# Patient Record
Sex: FEMALE | Race: WHITE | NOT HISPANIC OR LATINO | Employment: FULL TIME | URBAN - METROPOLITAN AREA
[De-identification: names, ages, dates, MRNs, and addresses within clinical notes are randomized per-mention and may not be internally consistent; named-entity substitution may affect disease eponyms.]

---

## 2019-06-27 ENCOUNTER — OFFICE VISIT (OUTPATIENT)
Dept: FAMILY MEDICINE CLINIC | Facility: CLINIC | Age: 24
End: 2019-06-27

## 2019-06-27 VITALS
DIASTOLIC BLOOD PRESSURE: 74 MMHG | WEIGHT: 150 LBS | HEART RATE: 60 BPM | HEIGHT: 70 IN | TEMPERATURE: 98.2 F | SYSTOLIC BLOOD PRESSURE: 120 MMHG | BODY MASS INDEX: 21.47 KG/M2

## 2019-06-27 DIAGNOSIS — J45.909 ASTHMA, UNSPECIFIED ASTHMA SEVERITY, UNSPECIFIED WHETHER COMPLICATED, UNSPECIFIED WHETHER PERSISTENT: Primary | ICD-10-CM

## 2019-06-27 DIAGNOSIS — Z13.0 SCREENING FOR DEFICIENCY ANEMIA: ICD-10-CM

## 2019-06-27 DIAGNOSIS — Z00.00 ANNUAL PHYSICAL EXAM: ICD-10-CM

## 2019-06-27 DIAGNOSIS — Z76.89 REFERRAL OF PATIENT: ICD-10-CM

## 2019-06-27 DIAGNOSIS — Z13.1 SCREENING FOR DIABETES MELLITUS: ICD-10-CM

## 2019-06-27 DIAGNOSIS — Z13.220 SCREENING FOR LIPID DISORDERS: ICD-10-CM

## 2019-06-27 DIAGNOSIS — Z13.29 SCREENING FOR THYROID DISORDER: ICD-10-CM

## 2019-06-27 PROCEDURE — 99385 PREV VISIT NEW AGE 18-39: CPT | Performed by: NURSE PRACTITIONER

## 2019-06-27 RX ORDER — BUDESONIDE AND FORMOTEROL FUMARATE DIHYDRATE 160; 4.5 UG/1; UG/1
AEROSOL RESPIRATORY (INHALATION) EVERY 12 HOURS SCHEDULED
COMMUNITY
End: 2019-06-27 | Stop reason: SDUPTHER

## 2019-06-27 RX ORDER — LEVOCETIRIZINE DIHYDROCHLORIDE 5 MG/1
5 TABLET, FILM COATED ORAL EVERY EVENING
COMMUNITY
End: 2019-06-27

## 2019-06-27 RX ORDER — ALBUTEROL SULFATE 90 UG/1
2 AEROSOL, METERED RESPIRATORY (INHALATION) EVERY 4 HOURS PRN
COMMUNITY
End: 2019-06-27 | Stop reason: SDUPTHER

## 2019-06-27 RX ORDER — LEVOCETIRIZINE DIHYDROCHLORIDE 5 MG/1
TABLET, FILM COATED ORAL
COMMUNITY
End: 2022-12-09 | Stop reason: SDUPTHER

## 2019-06-27 RX ORDER — AZELASTINE 1 MG/ML
2 SPRAY, METERED NASAL 2 TIMES DAILY
COMMUNITY
End: 2019-06-27

## 2019-06-27 RX ORDER — IPRATROPIUM BROMIDE AND ALBUTEROL SULFATE 2.5; .5 MG/3ML; MG/3ML
3 SOLUTION RESPIRATORY (INHALATION) EVERY 4 HOURS PRN
COMMUNITY
End: 2019-07-02 | Stop reason: SDUPTHER

## 2019-06-27 RX ORDER — AZELASTINE 1 MG/ML
SPRAY, METERED NASAL EVERY 12 HOURS SCHEDULED
COMMUNITY
End: 2022-12-09 | Stop reason: SDUPTHER

## 2019-06-27 RX ORDER — ALBUTEROL SULFATE 90 UG/1
2 AEROSOL, METERED RESPIRATORY (INHALATION) EVERY 4 HOURS PRN
Qty: 18 G | Refills: 11 | Status: SHIPPED | OUTPATIENT
Start: 2019-06-27

## 2019-06-27 RX ORDER — BUDESONIDE AND FORMOTEROL FUMARATE DIHYDRATE 160; 4.5 UG/1; UG/1
2 AEROSOL RESPIRATORY (INHALATION) 2 TIMES DAILY PRN
Qty: 10.2 G | Refills: 11 | Status: SHIPPED | OUTPATIENT
Start: 2019-06-27 | End: 2020-04-07 | Stop reason: SDUPTHER

## 2019-06-27 RX ORDER — BUDESONIDE AND FORMOTEROL FUMARATE DIHYDRATE 160; 4.5 UG/1; UG/1
2 AEROSOL RESPIRATORY (INHALATION)
COMMUNITY
End: 2019-06-27

## 2019-06-27 NOTE — PROGRESS NOTES
Marilu Salvador presents today to establish care.    Chief Complaint   Patient presents with   • Establish Care     new patient she comes in today to be seen for asthma and medication refill.   • Med Refill   • Annual Exam        HPI   Marilu presents today to establish care.  Has no concerns today and just needs medication refills.  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  Previously worked at .    Asthma  Chronic.  Stable.  History of asthma since childhood.  Needing refill on Symbicort.  She can tell when she does not use her Symbicort daily.  Has a rescue albuterol inhaler.  Also has DuoNeb at home as needed.  Has not had to use this in a while.  Does not have a specialist such as pulmonologist or allergy specialist.    Digestion issues  New.  Improving.  States that she just recently went to gluten-free and feels that it is helping her digestion issues.  Before, she had irritable bowel issues.  She is requesting some information on gluten-free foods.  Declines food allergy testing at this time.    Health Maintenance:  -Sees dentist and opthalmology regularly.  -Sexually active  -Birth control: Mirena  -Use of seatbelt 100% of the time  -Smoke/CO detector working in home  -Nonsmoker  -Vaccines UTD  -Other providers: Women's health at .  UTD on pap.  Requesting [facility] gynecology provider.  I will place referral.    PHQ-2/PHQ-9 Depression Screening 6/27/2019   Little interest or pleasure in doing things 0   Feeling down, depressed, or hopeless 0   Total Score 0       Review of Systems   Constitutional: Negative for activity change, fatigue, unexpected weight gain and unexpected weight loss.   HENT: Negative for congestion.    Eyes: Negative for blurred vision and visual disturbance.   Respiratory: Negative for chest tightness, shortness of breath and wheezing.    Cardiovascular: Negative for chest pain, palpitations and leg swelling.   Gastrointestinal: Negative for constipation,  diarrhea, nausea, vomiting and GERD.   Musculoskeletal: Negative for arthralgias.   Neurological: Negative for dizziness, light-headedness, headache and confusion.   Psychiatric/Behavioral: Negative for depressed mood and stress. The patient is not nervous/anxious.         Past Medical History:   Diagnosis Date   • Asthma    • GERD (gastroesophageal reflux disease)    • Kidney stones         Past Surgical History:   Procedure Laterality Date   • TONSILLECTOMY     • UMBILICAL HERNIA REPAIR     • WISDOM TOOTH EXTRACTION          Family History   Problem Relation Age of Onset   • Arthritis Mother    • Thyroid disease Mother    • Hypertension Father         Social History     Socioeconomic History   • Marital status: Single     Spouse name: Not on file   • Number of children: Not on file   • Years of education: Not on file   • Highest education level: Not on file   Tobacco Use   • Smoking status: Never Smoker   • Smokeless tobacco: Never Used   Substance and Sexual Activity   • Alcohol use: Yes     Comment: 2-3 per week   • Drug use: No   • Sexual activity: Defer        Current Outpatient Medications on File Prior to Visit   Medication Sig Dispense Refill   • ipratropium-albuterol (DUO-NEB) 0.5-2.5 mg/3 ml nebulizer Take 3 mL by nebulization Every 4 (Four) Hours As Needed.     • levonorgestrel (MIRENA, 52 MG,) 20 MCG/24HR IUD 1 each by Intrauterine route 1 (One) Time.     • [DISCONTINUED] albuterol sulfate  (90 Base) MCG/ACT inhaler Inhale 2 puffs Every 4 (Four) Hours As Needed for Wheezing.     • [DISCONTINUED] azelastine (ASTELIN) 0.1 % nasal spray 2 sprays into the nostril(s) as directed by provider 2 (Two) Times a Day. Use in each nostril as directed     • [DISCONTINUED] budesonide-formoterol (SYMBICORT) 160-4.5 MCG/ACT inhaler Inhale 2 puffs 2 (Two) Times a Day.     • [DISCONTINUED] levocetirizine (XYZAL) 5 MG tablet Take 5 mg by mouth Every Evening.     • azelastine (ASTELIN) 0.1 % nasal spray Every 12  "(Twelve) Hours.     • levocetirizine (XYZAL) 5 MG tablet Xyzal 5 mg tablet   Take 1 tablet every day by oral route for 30 days.     • [DISCONTINUED] budesonide-formoterol (SYMBICORT) 160-4.5 MCG/ACT inhaler Every 12 (Twelve) Hours.       No current facility-administered medications on file prior to visit.        No Known Allergies     Visit Vitals  /74   Pulse 60   Temp 98.2 °F (36.8 °C)   Ht 177.8 cm (70\")   Wt 68 kg (150 lb)   BMI 21.52 kg/m²        Physical Exam   Constitutional: She is oriented to person, place, and time. Vital signs are normal. She appears well-developed and well-nourished. No distress.   HENT:   Head: Normocephalic.   Right Ear: Hearing, tympanic membrane, external ear and ear canal normal.   Left Ear: Hearing, tympanic membrane, external ear and ear canal normal.   Nose: Nose normal.   Mouth/Throat: Uvula is midline and oropharynx is clear and moist.   Eyes: Conjunctivae and lids are normal. Pupils are equal, round, and reactive to light. Right eye exhibits no discharge. Left eye exhibits no discharge. No scleral icterus.   Neck: Normal range of motion. No JVD present. Carotid bruit is not present. No thyromegaly present.   Cardiovascular: Normal rate, regular rhythm, normal heart sounds and intact distal pulses. Exam reveals no gallop and no friction rub.   No murmur heard.  Pulmonary/Chest: Effort normal and breath sounds normal. No respiratory distress.   Abdominal: Soft. Normal appearance and bowel sounds are normal. She exhibits no distension and no mass. There is no hepatosplenomegaly. There is no tenderness. There is no rebound, no guarding, no CVA tenderness, no tenderness at McBurney's point and negative Warner's sign. No hernia.   Musculoskeletal: Normal range of motion. She exhibits no edema.   Lymphadenopathy:        Head (right side): No submental, no submandibular, no tonsillar, no preauricular, no posterior auricular and no occipital adenopathy present.        Head (left " side): No submental, no submandibular, no tonsillar, no preauricular, no posterior auricular and no occipital adenopathy present.     She has no cervical adenopathy.   Neurological: She is alert and oriented to person, place, and time. She has normal strength. She displays normal reflexes. No sensory deficit. Coordination normal. GCS eye subscore is 4. GCS verbal subscore is 5. GCS motor subscore is 6.   Skin: Skin is warm, dry and intact. Capillary refill takes less than 2 seconds. No rash noted. No erythema.   Psychiatric: She has a normal mood and affect. Her speech is normal and behavior is normal. Judgment and thought content normal.   Nursing note and vitals reviewed.       No results found for this or any previous visit.     Assessment/Plan  Marilu was seen today for establish care, med refill and annual exam.    Diagnoses and all orders for this visit:    Asthma, unspecified asthma severity, unspecified whether complicated, unspecified whether persistent  -     albuterol sulfate  (90 Base) MCG/ACT inhaler; Inhale 2 puffs Every 4 (Four) Hours As Needed for Wheezing.  -     budesonide-formoterol (SYMBICORT) 160-4.5 MCG/ACT inhaler; Inhale 2 puffs 2 (Two) Times a Day As Needed (shortness of breath).    Screening for deficiency anemia  -     CBC & Differential; Future  Screening for diabetes mellitus  -     Comprehensive Metabolic Panel; Future  Screening for lipid disorders  -     Lipid Panel; Future  Screening for thyroid disorder  -     TSH Rfx On Abnormal To Free T4; Future  Referral of patient  -     Ambulatory Referral to Gynecology    Annual physical exam      Assessment/Plan   1. Healthy female exam.    2. Patient Counseling: Including but not Limited to the following, when appropriate:  --Nutrition: Stressed importance of moderation in sodium/caffeine intake, saturated fat and cholesterol, caloric balance, sufficient intake of fresh fruits, vegetables, fiber, calcium, iron, and 1 mg of folate  "supplement per day (for females capable of pregnancy).  --Discussed the issue of estrogen replacement, calcium supplement, and the daily use of baby aspirin.  --Exercise: Stressed the importance of regular exercise.   --Substance Abuse: Discussed cessation/primary prevention of tobacco, alcohol, or other drug use; driving or other dangerous activities under the influence; availability of treatment for abuse, as indicated based on social history.    --Sexuality: Discussed sexually transmitted diseases, partner selection, use of condoms, avoidance of unintended pregnancy  and contraceptive alternatives.   --Injury prevention: Discussed safety belts, safety helmets, smoke detector, smoking near bedding or upholstery.   --Dental health: Discussed importance of regular tooth brushing, flossing, and dental visits.  --Immunizations reviewed.  --Discussed benefits of colon cancer screening.      3. Discussed the patient's BMI with her.  The BMI is in the acceptable range  4. Return in about 1 year (around 6/27/2020) for Annual.  5. Age-appropriate Screening Scheduled  6.   Patient Instructions   Low-Gluten Eating Plan  Gluten is a protein that is found in wheat, barley, rye, and some other grains. Some people have a condition that makes them unable to digest gluten. For those people, eating just a small amount of gluten can damage their intestines.  This is not a gluten-free eating plan. This low-gluten eating plan is for people who feel better when they eat less gluten.  What do I need to know about this eating plan?  · You can eat anything that does not contain wheat or other grains that have gluten.  · You can eat anything that is labeled \"gluten-free.\"  · Make sure to read food labels.  · Eat a variety of foods so you get all of the nutrients that you need.  · Avoid processed foods and sauces because many of them contain wheat. To have more control over the ingredients in your meals, consider making food yourself " "instead of buying prepared foods.  What foods can I eat?  Grains  Rice. Bulgur. Quinoa. Corn. Buckwheat. Amaranth. Corn tortillas or taco shells. Oatmeal that is labeled as “gluten free” or “uncontaminated.\"  Vegetables  Lettuce. Spinach. Peas. Beets. Cauliflower. Cabbage. Broccoli. Carrots. Tomatoes. Squash. Eggplant. Herbs. Peppers. Onions. Cucumbers. [place] sprouts. Yams and sweet potatoes. Beans. Lentils.  Fruits  Bananas. Apples. Oranges. Grapes. Papaya. Dominick. Pomegranate. Kiwi. Grapefruit. Cherries.  Meats and Other Protein Sources  Beef. Pork. Chicken. Turkey. Fish. Eggs. Tofu. Beans. Nuts. Lentils.  Dairy  Milk. Ice cream. Yogurt. Cheese. Cottage cheese.  Beverages  Water. Coffee. Tea. Juice. Soda. [place] water.  Condiments  Mustard. Relish. Low-fat, low-sugar ketchup. Low-fat, low-sugar barbecue sauce. Vinegar. Low-fat or fat-free mayonnaise.  Sweets and Desserts  Honey. Sugar. Maple syrup.  Fats and Oils  Butter. Vegetable oil. Olive oil. Canola oil. [place] oil.  Other  Arrowroot or cornstarch. Potato flour.  The items listed above may not be a complete list of recommended foods or beverages. Contact your dietitian for more options.  What foods are not recommended?  Grains  Wheat. Barley. Rye. Oatmeal.  Meat and Other Protein Sources  Seitan. Cold cuts. Hotdogs. Salami. Sausages.  Beverages  Beer.  Condiments  Malt vinegar. Salad dressing. Soy sauce.  Sweets and Desserts  Licorice. Brown rice syrup. Pre-made pudding or pudding mixes.  Other  Bouillon cubes. Canned or boxed pre-made soups or soup packets. Bagged chips, such as potato chips and tortilla chips. Seasoning packets.  The items listed above may not be a complete list of foods and beverages to avoid. Contact your dietitian for more information.  This information is not intended to replace advice given to you by your health care provider. Make sure you discuss any questions you have with your health care provider.  Document Released: 05/03/2016 " "Document Revised: 05/25/2017 Document Reviewed: 01/13/2016  [employer] Interactive Patient Education © 2019 [employer]  Gluten-Free Diet for Celiac Disease, Adult  The gluten-free diet includes all foods that do not contain gluten. Gluten is a protein that is found in wheat, rye, barley, and some other grains. Following the gluten-free diet is the only treatment for people with celiac disease. It helps to prevent damage to the intestines and improves or eliminates the symptoms of celiac disease.  Following the gluten-free diet requires some planning. It can be challenging at first, but it gets easier with time and practice. There are more gluten-free options available today than ever before. If you need help finding gluten-free foods or if you have questions, talk with your diet and nutrition specialist (registered dietitian) or your health care provider.  What do I need to know about a gluten-free diet?  · All fruits, vegetables, and meats are safe to eat and do not contain gluten.  · When grocery shopping, start by shopping in the produce, meat, and dairy sections. These sections are more likely to contain gluten-free foods. Then move to the aisles that contain packaged foods if you need to.  · Read all food labels. Gluten is often added to foods. Always check the ingredient list and look for warnings, such as “may contain gluten.\"  · Talk with your dietitian or health care provider before taking a gluten-free multivitamin or mineral supplement.  · Be aware of gluten-free foods having contact with foods that contain gluten (cross-contamination). This can happen at home and with any processed foods.  ? Talk with your health care provider or dietitian about how to reduce the risk of cross-contamination in your home.  ? If you have questions about how a food is processed, ask the .  What key words help to identify gluten?  Foods that list any of these key words on the label usually contain " gluten:  · Wheat, flour, enriched flour, bromated flour, white flour, durum flour, jenifer flour, phosphated flour, self-rising flour, semolina, farina, barley (malt), rye, and oats.  · Starch, dextrin, modified food starch, or cereal.  · Thickening, fillers, or emulsifiers.  · Malt flavoring, malt extract, or malt syrup.  · Hydrolyzed vegetable protein.    In the [place] packaged foods that are gluten-free are required to be labeled “GF.” These foods should be easy to identify and are safe to eat. In the [place] food companies are also required to list common food allergens, including wheat, on their labels.  Recommended foods  Grains  · Amaranth, bean flours, 100% buckwheat flour, corn, millet, nut flours or nut meals, GF oats, quinoa, rice, sorghum, teff, rice wafers, pure cornmeal tortillas, popcorn, and hot cereals made from cornmeal. [place], rice, wild rice. Some Asian rice noodles or bean noodles. Arrowroot starch, corn bran, corn flour, corn germ, cornmeal, corn starch, potato flour, potato starch flour, and rice bran. Plain, brown, and sweet rice flours. Rice polish, soy flour, and tapioca starch.  Vegetables  · All plain fresh, frozen, and canned vegetables.  Fruits  · All plain fresh, frozen, canned, and dried fruits, and 100% fruit juices.  Meats and other protein foods  · All fresh beef, pork, poultry, fish, seafood, and eggs. Fish canned in water, oil, brine, or vegetable broth. Plain nuts and seeds, peanut butter. Some lunch meat and some frankfurters. Dried beans, dried peas, and lentils.  Dairy  · Fresh plain, dry, evaporated, or condensed milk. Cream, butter, sour cream, whipping cream, and most yogurts. Unprocessed cheese, most processed cheeses, some cottage cheese, some cream cheeses.  Beverages  · Coffee, tea, most herbal teas. Carbonated beverages and some root beers. Wine, sake, and distilled spirits, such as gin, vodka, and whiskey. Most hard ciders.  Fats and oils  · Butter, margarine, vegetable  oil, hydrogenated butter, olive oil, shortening, lard, cream, and some mayonnaise. Some commercial salad dressings. Olives.  Sweets and desserts  · Sugar, honey, some syrups, molasses, jelly, and jam. Plain hard candy, marshmallows, and gumdrops. Pure cocoa powder. Plain chocolate. Custard and some pudding mixes. Gelatin desserts, sorbets, frozen ice pops, and sherbet. Cake, cookies, and other desserts prepared with allowed flours. Some commercial ice creams. Cornstarch, tapioca, and rice puddings.  Seasoning and other foods  · Some canned or frozen soups. Monosodium glutamate (MSG). Cider, rice, and wine vinegar. Baking soda and baking powder. Cream of tartar. Baking and nutritional yeast. Certain soy sauces made without wheat (ask your dietitian about specific brands that are allowed). Nuts, coconut, and chocolate. Salt, pepper, herbs, spices, flavoring extracts, imitation or artificial flavorings, natural flavorings, and food colorings. Some medicines and supplements. Some lip glosses and other cosmetics. Rice syrups.  The items listed may not be a complete list. Talk with your dietitian about what dietary choices are best for you.  Foods to avoid  Grains  · Barley, bran, bulgur, couscous, cracked wheat, [place], farro, jenifer, malt, matzo, semolina, wheat germ, and all wheat and rye cereals including spelt and kamut. Cereals containing malt as a flavoring, such as rice cereal. Noodles, spaghetti, macaroni, most packaged rice mixes, and all mixes containing wheat, rye, barley, or triticale.  Vegetables  · Most creamed vegetables and most vegetables canned in sauces. Some commercially prepared vegetables and salads.  Fruits  · Thickened or prepared fruits and some pie fillings. Some fruit snacks and fruit roll-ups.  Meats and other protein foods  · Any meat or meat alternative containing wheat, rye, barley, or gluten stabilizers. These are often marinated or packaged meats and lunch meats. Bread-containing  products, such as Swiss steak, croquettes, meatballs, and meatloaf. Most tuna canned in vegetable broth and turkey with hydrolyzed vegetable protein (HVP) injected as part of the basting. Seitan. Imitation fish. Eggs in sauces made from ingredients to avoid.  Dairy  · Commercial chocolate milk drinks and malted milk. Some non-dairy creamers. Any cheese product containing ingredients to avoid.  Beverages  · Certain cereal beverages. Beer, araceli, malted milk, and some root beers. Some hard ciders. Some instant flavored coffees. Some herbal teas made with barley or with barley malt added.  Fats and oils  · Some commercial salad dressings. Sour cream containing modified food starch.  Sweets and desserts  · Some toffees. Chocolate-coated nuts (may be rolled in wheat flour) and some commercial candies and candy bars. Most cakes, cookies, donuts, pastries, and other baked goods. Some commercial ice cream. Ice cream cones. Commercially prepared mixes for cakes, cookies, and other desserts. Bread pudding and other puddings thickened with flour. Products containing brown rice syrup made with barley malt enzyme. Desserts and sweets made with malt flavoring.  Seasoning and other foods  · Some peralta powders, some dry seasoning mixes, some gravy extracts, some meat sauces, some ketchups, some prepared mustards, and horseradish. Certain soy sauces. Malt vinegar. Bouillon and bouillon cubes that contain HVP. Some chip dips, and some chewing gum. Yeast extract. Stewart’s yeast. Caramel color. Some medicines and supplements. Some lip glosses and other cosmetics.  The items listed may not be a complete list. Talk with your dietitian about what dietary choices are best for you.  Summary  · Gluten is a protein that is found in wheat, rye, barley, and some other grains. The gluten-free diet includes all foods that do not contain gluten.  · If you need help finding gluten-free foods or if you have questions, talk with your diet and nutrition  "specialist (registered dietitian) or your health care provider.  · Read all food labels. Gluten is often added to foods. Always check the ingredient list and look for warnings, such as “may contain gluten.\"  This information is not intended to replace advice given to you by your health care provider. Make sure you discuss any questions you have with your health care provider.  Document Released: 12/18/2006 Document Revised: 10/02/2017 Document Reviewed: 10/02/2017  [employer] Interactive Patient Education © 2019 [employer]           I will notify lab results.  Return in about 1 year (around 6/27/2020) for Annual.  "

## 2019-06-27 NOTE — PATIENT INSTRUCTIONS
"Low-Gluten Eating Plan  Gluten is a protein that is found in wheat, barley, rye, and some other grains. Some people have a condition that makes them unable to digest gluten. For those people, eating just a small amount of gluten can damage their intestines.  This is not a gluten-free eating plan. This low-gluten eating plan is for people who feel better when they eat less gluten.  What do I need to know about this eating plan?  · You can eat anything that does not contain wheat or other grains that have gluten.  · You can eat anything that is labeled \"gluten-free.\"  · Make sure to read food labels.  · Eat a variety of foods so you get all of the nutrients that you need.  · Avoid processed foods and sauces because many of them contain wheat. To have more control over the ingredients in your meals, consider making food yourself instead of buying prepared foods.  What foods can I eat?  Grains  Rice. Bulgur. Quinoa. Corn. Buckwheat. Amaranth. Corn tortillas or taco shells. Oatmeal that is labeled as “gluten free” or “uncontaminated.\"  Vegetables  Lettuce. Spinach. Peas. Beets. Cauliflower. Cabbage. Broccoli. Carrots. Tomatoes. Squash. Eggplant. Herbs. Peppers. Onions. Cucumbers. [place] sprouts. Yams and sweet potatoes. Beans. Lentils.  Fruits  Bananas. Apples. Oranges. Grapes. Papaya. Dominick. Pomegranate. Kiwi. Grapefruit. Cherries.  Meats and Other Protein Sources  Beef. Pork. Chicken. Turkey. Fish. Eggs. Tofu. Beans. Nuts. Lentils.  Dairy  Milk. Ice cream. Yogurt. Cheese. Cottage cheese.  Beverages  Water. Coffee. Tea. Juice. Soda. [place] water.  Condiments  Mustard. Relish. Low-fat, low-sugar ketchup. Low-fat, low-sugar barbecue sauce. Vinegar. Low-fat or fat-free mayonnaise.  Sweets and Desserts  Honey. Sugar. Maple syrup.  Fats and Oils  Butter. Vegetable oil. Olive oil. Canola oil. [place] oil.  Other  Arrowroot or cornstarch. Potato flour.  The items listed above may not be a complete list of recommended foods " or beverages. Contact your dietitian for more options.  What foods are not recommended?  Grains  Wheat. Barley. Rye. Oatmeal.  Meat and Other Protein Sources  Seitan. Cold cuts. Hotdogs. Salami. Sausages.  Beverages  Beer.  Condiments  Malt vinegar. Salad dressing. Soy sauce.  Sweets and Desserts  Licorice. Brown rice syrup. Pre-made pudding or pudding mixes.  Other  Bouillon cubes. Canned or boxed pre-made soups or soup packets. Bagged chips, such as potato chips and tortilla chips. Seasoning packets.  The items listed above may not be a complete list of foods and beverages to avoid. Contact your dietitian for more information.  This information is not intended to replace advice given to you by your health care provider. Make sure you discuss any questions you have with your health care provider.  Document Released: 05/03/2016 Document Revised: 05/25/2017 Document Reviewed: 01/13/2016  [employer] Interactive Patient Education © 2019 [employer]  Gluten-Free Diet for Celiac Disease, Adult  The gluten-free diet includes all foods that do not contain gluten. Gluten is a protein that is found in wheat, rye, barley, and some other grains. Following the gluten-free diet is the only treatment for people with celiac disease. It helps to prevent damage to the intestines and improves or eliminates the symptoms of celiac disease.  Following the gluten-free diet requires some planning. It can be challenging at first, but it gets easier with time and practice. There are more gluten-free options available today than ever before. If you need help finding gluten-free foods or if you have questions, talk with your diet and nutrition specialist (registered dietitian) or your health care provider.  What do I need to know about a gluten-free diet?  · All fruits, vegetables, and meats are safe to eat and do not contain gluten.  · When grocery shopping, start by shopping in the produce, meat, and dairy sections. These sections are more  "likely to contain gluten-free foods. Then move to the aisles that contain packaged foods if you need to.  · Read all food labels. Gluten is often added to foods. Always check the ingredient list and look for warnings, such as “may contain gluten.\"  · Talk with your dietitian or health care provider before taking a gluten-free multivitamin or mineral supplement.  · Be aware of gluten-free foods having contact with foods that contain gluten (cross-contamination). This can happen at home and with any processed foods.  ? Talk with your health care provider or dietitian about how to reduce the risk of cross-contamination in your home.  ? If you have questions about how a food is processed, ask the .  What key words help to identify gluten?  Foods that list any of these key words on the label usually contain gluten:  · Wheat, flour, enriched flour, bromated flour, white flour, durum flour, jenifer flour, phosphated flour, self-rising flour, semolina, farina, barley (malt), rye, and oats.  · Starch, dextrin, modified food starch, or cereal.  · Thickening, fillers, or emulsifiers.  · Malt flavoring, malt extract, or malt syrup.  · Hydrolyzed vegetable protein.    In the [place] packaged foods that are gluten-free are required to be labeled “GF.” These foods should be easy to identify and are safe to eat. In the [place] food companies are also required to list common food allergens, including wheat, on their labels.  Recommended foods  Grains  · Amaranth, bean flours, 100% buckwheat flour, corn, millet, nut flours or nut meals, GF oats, quinoa, rice, sorghum, teff, rice wafers, pure cornmeal tortillas, popcorn, and hot cereals made from cornmeal. [place], rice, wild rice. Some Asian rice noodles or bean noodles. Arrowroot starch, corn bran, corn flour, corn germ, cornmeal, corn starch, potato flour, potato starch flour, and rice bran. Plain, brown, and sweet rice flours. Rice polish, soy flour, and tapioca " starch.  Vegetables  · All plain fresh, frozen, and canned vegetables.  Fruits  · All plain fresh, frozen, canned, and dried fruits, and 100% fruit juices.  Meats and other protein foods  · All fresh beef, pork, poultry, fish, seafood, and eggs. Fish canned in water, oil, brine, or vegetable broth. Plain nuts and seeds, peanut butter. Some lunch meat and some frankfurters. Dried beans, dried peas, and lentils.  Dairy  · Fresh plain, dry, evaporated, or condensed milk. Cream, butter, sour cream, whipping cream, and most yogurts. Unprocessed cheese, most processed cheeses, some cottage cheese, some cream cheeses.  Beverages  · Coffee, tea, most herbal teas. Carbonated beverages and some root beers. Wine, sake, and distilled spirits, such as gin, vodka, and whiskey. Most hard ciders.  Fats and oils  · Butter, margarine, vegetable oil, hydrogenated butter, olive oil, shortening, lard, cream, and some mayonnaise. Some commercial salad dressings. Olives.  Sweets and desserts  · Sugar, honey, some syrups, molasses, jelly, and jam. Plain hard candy, marshmallows, and gumdrops. Pure cocoa powder. Plain chocolate. Custard and some pudding mixes. Gelatin desserts, sorbets, frozen ice pops, and sherbet. Cake, cookies, and other desserts prepared with allowed flours. Some commercial ice creams. Cornstarch, tapioca, and rice puddings.  Seasoning and other foods  · Some canned or frozen soups. Monosodium glutamate (MSG). Cider, rice, and wine vinegar. Baking soda and baking powder. Cream of tartar. Baking and nutritional yeast. Certain soy sauces made without wheat (ask your dietitian about specific brands that are allowed). Nuts, coconut, and chocolate. Salt, pepper, herbs, spices, flavoring extracts, imitation or artificial flavorings, natural flavorings, and food colorings. Some medicines and supplements. Some lip glosses and other cosmetics. Rice syrups.  The items listed may not be a complete list. Talk with your dietitian  about what dietary choices are best for you.  Foods to avoid  Grains  · Barley, bran, bulgur, couscous, cracked wheat, [place], farro, jenifer, malt, matzo, semolina, wheat germ, and all wheat and rye cereals including spelt and kamut. Cereals containing malt as a flavoring, such as rice cereal. Noodles, spaghetti, macaroni, most packaged rice mixes, and all mixes containing wheat, rye, barley, or triticale.  Vegetables  · Most creamed vegetables and most vegetables canned in sauces. Some commercially prepared vegetables and salads.  Fruits  · Thickened or prepared fruits and some pie fillings. Some fruit snacks and fruit roll-ups.  Meats and other protein foods  · Any meat or meat alternative containing wheat, rye, barley, or gluten stabilizers. These are often marinated or packaged meats and lunch meats. Bread-containing products, such as Swiss steak, croquettes, meatballs, and meatloaf. Most tuna canned in vegetable broth and turkey with hydrolyzed vegetable protein (HVP) injected as part of the basting. Seitan. Imitation fish. Eggs in sauces made from ingredients to avoid.  Dairy  · Commercial chocolate milk drinks and malted milk. Some non-dairy creamers. Any cheese product containing ingredients to avoid.  Beverages  · Certain cereal beverages. Beer, araceli, malted milk, and some root beers. Some hard ciders. Some instant flavored coffees. Some herbal teas made with barley or with barley malt added.  Fats and oils  · Some commercial salad dressings. Sour cream containing modified food starch.  Sweets and desserts  · Some toffees. Chocolate-coated nuts (may be rolled in wheat flour) and some commercial candies and candy bars. Most cakes, cookies, donuts, pastries, and other baked goods. Some commercial ice cream. Ice cream cones. Commercially prepared mixes for cakes, cookies, and other desserts. Bread pudding and other puddings thickened with flour. Products containing brown rice syrup made with barley malt enzyme.  "Desserts and sweets made with malt flavoring.  Seasoning and other foods  · Some peralta powders, some dry seasoning mixes, some gravy extracts, some meat sauces, some ketchups, some prepared mustards, and horseradish. Certain soy sauces. Malt vinegar. Bouillon and bouillon cubes that contain HVP. Some chip dips, and some chewing gum. Yeast extract. Stewart’s yeast. Caramel color. Some medicines and supplements. Some lip glosses and other cosmetics.  The items listed may not be a complete list. Talk with your dietitian about what dietary choices are best for you.  Summary  · Gluten is a protein that is found in wheat, rye, barley, and some other grains. The gluten-free diet includes all foods that do not contain gluten.  · If you need help finding gluten-free foods or if you have questions, talk with your diet and nutrition specialist (registered dietitian) or your health care provider.  · Read all food labels. Gluten is often added to foods. Always check the ingredient list and look for warnings, such as “may contain gluten.\"  This information is not intended to replace advice given to you by your health care provider. Make sure you discuss any questions you have with your health care provider.  Document Released: 12/18/2006 Document Revised: 10/02/2017 Document Reviewed: 10/02/2017  [employer] Interactive Patient Education © 2019 [employer]    "

## 2019-06-28 ENCOUNTER — TELEPHONE (OUTPATIENT)
Dept: FAMILY MEDICINE CLINIC | Facility: CLINIC | Age: 24
End: 2019-06-28

## 2019-06-28 NOTE — TELEPHONE ENCOUNTER
Patient called wanting to switch her Proair inhaler and symbicort over to the [facility] retail pharmacy. She called Kim to switch them as instructed and they sent her back to us. Her call back number is [phone]

## 2019-06-28 NOTE — TELEPHONE ENCOUNTER
I spoke to [facility] pharmacy they instructed the patient to call [facility] pharmacy to have the medications transferred. I called [facility] pharmacy and told them that she would like to have the rx's sent to [facility] transferred to [facility]. The person that I spoke to could see all of the prescriptions that were sent to [facility] yesterday they will dispense them to the patient after they get them transferred.

## 2019-07-02 RX ORDER — IPRATROPIUM BROMIDE AND ALBUTEROL SULFATE 2.5; .5 MG/3ML; MG/3ML
3 SOLUTION RESPIRATORY (INHALATION) EVERY 4 HOURS PRN
Qty: 360 ML | Refills: 0 | Status: SHIPPED | OUTPATIENT
Start: 2019-07-02 | End: 2022-06-04 | Stop reason: SDUPTHER

## 2019-09-23 ENCOUNTER — OFFICE VISIT (OUTPATIENT)
Dept: FAMILY MEDICINE CLINIC | Facility: CLINIC | Age: 24
End: 2019-09-23

## 2019-09-23 VITALS
DIASTOLIC BLOOD PRESSURE: 88 MMHG | TEMPERATURE: 98.3 F | OXYGEN SATURATION: 98 % | HEART RATE: 84 BPM | BODY MASS INDEX: 22.33 KG/M2 | SYSTOLIC BLOOD PRESSURE: 120 MMHG | WEIGHT: 156 LBS | HEIGHT: 70 IN

## 2019-09-23 DIAGNOSIS — R10.84 GENERALIZED ABDOMINAL PAIN: ICD-10-CM

## 2019-09-23 DIAGNOSIS — Z13.0 SCREENING FOR DEFICIENCY ANEMIA: ICD-10-CM

## 2019-09-23 DIAGNOSIS — Z13.1 SCREENING FOR DIABETES MELLITUS: ICD-10-CM

## 2019-09-23 DIAGNOSIS — Z13.29 SCREENING FOR THYROID DISORDER: ICD-10-CM

## 2019-09-23 DIAGNOSIS — R06.02 SHORTNESS OF BREATH: Primary | ICD-10-CM

## 2019-09-23 DIAGNOSIS — R14.0 BLOATING: ICD-10-CM

## 2019-09-23 DIAGNOSIS — R07.9 CHEST PAIN, UNSPECIFIED TYPE: ICD-10-CM

## 2019-09-23 DIAGNOSIS — R80.9 PROTEINURIA, UNSPECIFIED TYPE: ICD-10-CM

## 2019-09-23 PROBLEM — J45.20 MILD INTERMITTENT ASTHMA: Status: ACTIVE | Noted: 2017-10-16

## 2019-09-23 LAB
B-HCG UR QL: NEGATIVE
BILIRUB BLD-MCNC: NEGATIVE MG/DL
CLARITY, POC: CLEAR
COLOR UR: YELLOW
GLUCOSE UR STRIP-MCNC: NEGATIVE MG/DL
INTERNAL NEGATIVE CONTROL: NEGATIVE
INTERNAL POSITIVE CONTROL: POSITIVE
KETONES UR QL: NEGATIVE
LEUKOCYTE EST, POC: NEGATIVE
Lab: NORMAL
NITRITE UR-MCNC: NEGATIVE MG/ML
PH UR: 7 [PH] (ref 5–8)
PROT UR STRIP-MCNC: ABNORMAL MG/DL
RBC # UR STRIP: NEGATIVE /UL
SP GR UR: 1.01 (ref 1–1.03)
UROBILINOGEN UR QL: NORMAL

## 2019-09-23 PROCEDURE — 87086 URINE CULTURE/COLONY COUNT: CPT | Performed by: NURSE PRACTITIONER

## 2019-09-23 PROCEDURE — 80053 COMPREHEN METABOLIC PANEL: CPT | Performed by: NURSE PRACTITIONER

## 2019-09-23 PROCEDURE — 85025 COMPLETE CBC W/AUTO DIFF WBC: CPT | Performed by: NURSE PRACTITIONER

## 2019-09-23 PROCEDURE — 81003 URINALYSIS AUTO W/O SCOPE: CPT | Performed by: NURSE PRACTITIONER

## 2019-09-23 PROCEDURE — 81025 URINE PREGNANCY TEST: CPT | Performed by: NURSE PRACTITIONER

## 2019-09-23 PROCEDURE — 84443 ASSAY THYROID STIM HORMONE: CPT | Performed by: NURSE PRACTITIONER

## 2019-09-23 PROCEDURE — 93000 ELECTROCARDIOGRAM COMPLETE: CPT | Performed by: NURSE PRACTITIONER

## 2019-09-23 PROCEDURE — 99214 OFFICE O/P EST MOD 30 MIN: CPT | Performed by: NURSE PRACTITIONER

## 2019-09-23 NOTE — PROGRESS NOTES
Chief Complaint   Patient presents with   • Chest Pain     SX for about 1 mth   • Shortness of Breath   • Abdominal Pain   • Numbness        HPI   Marilu presents for concerns of chest pain, right side and abdominal pain.    Right-sided chest pain  Acute.  Stable.  Ongoing for the last month.  Lasts about half of a day, comes and goes.  Occurs about twice per week.  Feels like pressure like someone sitting on her chest.  She did have one episode of left arm numbness that lasted all day but then resolved.  Also complains of breast tenderness on right side.  Intermittently, tender on palpation.  Not currently tender today.    Abdominal pain  Acute.  Stable.  Occurring over the last month.  Feels like burning in her abdomen, bloating feeling.  Denies blood in stool, changes in stool consistency, or nausea.  She denies anxiety or stress.  States that she stays well-hydrated throughout the day.  Drinking about 8 bottles of water throughout the day.    She does report that she drank alcohol yesterday which worsened her abdominal and chest pain.    Review of Systems   Constitutional: Negative for activity change, fatigue, unexpected weight gain and unexpected weight loss.   HENT: Negative for congestion.    Eyes: Negative for blurred vision and visual disturbance.   Respiratory: Positive for chest tightness. Negative for cough, shortness of breath and wheezing.    Cardiovascular: Negative for chest pain (chest pressure), palpitations and leg swelling.   Gastrointestinal: Positive for abdominal pain and indigestion. Negative for abdominal distention, blood in stool, constipation, diarrhea, nausea, vomiting and GERD.   Genitourinary: Positive for breast pain (tenderness bilateral). Negative for breast discharge, breast lump, difficulty urinating, dyspareunia, dysuria, hematuria, pelvic pain and urgency.   Musculoskeletal: Negative for arthralgias.   Neurological: Positive for numbness (left hand). Negative for dizziness,  light-headedness, headache and confusion.   Psychiatric/Behavioral: Negative for depressed mood and stress. The patient is not nervous/anxious.         Current Outpatient Medications on File Prior to Visit   Medication Sig Dispense Refill   • albuterol sulfate  (90 Base) MCG/ACT inhaler Inhale 2 puffs Every 4 (Four) Hours As Needed for Wheezing. 18 g 11   • azelastine (ASTELIN) 0.1 % nasal spray Every 12 (Twelve) Hours.     • budesonide-formoterol (SYMBICORT) 160-4.5 MCG/ACT inhaler Inhale 2 puffs 2 (Two) Times a Day for  (shortness of breath). 10.2 g 11   • ipratropium-albuterol (DUO-NEB) 0.5-2.5 mg/3 ml nebulizer Take 3 mL by nebulization Every 4 (Four) Hours As Needed for Wheezing. 360 mL 0   • levocetirizine (XYZAL) 5 MG tablet Xyzal 5 mg tablet   Take 1 tablet every day by oral route for 30 days.     • levonorgestrel (MIRENA, 52 MG,) 20 MCG/24HR IUD 1 each by Intrauterine route 1 (One) Time.       No current facility-administered medications on file prior to visit.        No Known Allergies    Past Medical History:   Diagnosis Date   • Asthma    • GERD (gastroesophageal reflux disease)    • Kidney stones         Past Surgical History:   Procedure Laterality Date   • TONSILLECTOMY     • UMBILICAL HERNIA REPAIR     • WISDOM TOOTH EXTRACTION          Family History   Problem Relation Age of Onset   • Arthritis Mother    • Thyroid disease Mother    • Hypertension Father         Social History     Socioeconomic History   • Marital status: Single     Spouse name: Not on file   • Number of children: Not on file   • Years of education: Not on file   • Highest education level: Not on file   Tobacco Use   • Smoking status: Never Smoker   • Smokeless tobacco: Never Used   Substance and Sexual Activity   • Alcohol use: Yes     Comment: 2-3 per week   • Drug use: No   • Sexual activity: Defer        Visit Vitals  /88 (BP Location: Right arm, Patient Position: Sitting, Cuff Size: Adult)   Pulse 84   Temp 98.3 °F  "(36.8 °C) (Temporal)   Ht 177.8 cm (70\")   Wt 70.8 kg (156 lb)   LMP  (LMP Unknown)   SpO2 98%   Breastfeeding? No   BMI 22.38 kg/m²          Physical Exam   Constitutional: She is oriented to person, place, and time. Vital signs are normal. She appears well-developed and well-nourished.   HENT:   Head: Normocephalic.   Cardiovascular: Normal rate, regular rhythm, normal heart sounds and intact distal pulses.   Pulmonary/Chest: Effort normal and breath sounds normal.   Abdominal: Soft. Bowel sounds are normal. There is no hepatosplenomegaly. There is no tenderness. There is no rebound, no guarding, no CVA tenderness, no tenderness at McBurney's point and negative Warner's sign.   Neurological: She is alert and oriented to person, place, and time.   Skin: Skin is warm and dry.   Psychiatric: She has a normal mood and affect. Her behavior is normal. Judgment and thought content normal.   Nursing note and vitals reviewed.      Results for orders placed or performed in visit on 09/23/19   POC Urinalysis Dipstick, Automated   Result Value Ref Range    Color Yellow Yellow, Straw, Dark Yellow, [place]    Clarity, UA Clear Clear    Specific Gravity  1.015 1.005 - 1.030    pH, Urine 7.0 5.0 - 8.0    Leukocytes Negative Negative    Nitrite, UA Negative Negative    Protein, POC Trace (A) Negative mg/dL    Glucose, UA Negative Negative, 1000 mg/dL (3+) mg/dL    Ketones, UA Negative Negative    Urobilinogen, UA Normal Normal    Bilirubin Negative Negative    Blood, UA Negative Negative   POCT pregnancy, urine   Result Value Ref Range    HCG, Urine, QL Negative Negative    Lot Number [id number]     Internal Positive Control Positive     Internal Negative Control Negative         ECG 12 Lead  Date/Time: 9/23/2019 3:50 PM  Performed by: Zeinab Beatty APRN  Authorized by: Zeinab Beatty APRN   Comparison: not compared with previous ECG   Rhythm: sinus rhythm  Rate: normal  Conduction: conduction normal  ST Segments: ST segments " normal  QRS axis: normal  Other: no other findings    Clinical impression: normal ECG            Marilu was seen today for chest pain, shortness of breath, abdominal pain and numbness.    Diagnoses and all orders for this visit:    Shortness of breath  Chest pain, unspecified type  -     ECG 12 Lead    Generalized abdominal pain  -     POC Urinalysis Dipstick, Automated-proteinuria- will send for culture  -     POCT pregnancy, urine- negative    Bloating  -Try OTC antacids and probiotic capsules.  She verbalized understanding.  She does report that she drank yesterday throughout the day, feels that it contributed to bloating.    Proteinuria, unspecified type  -     Urine Culture - Urine, Urine, Clean Catch; Future    Screening for deficiency anemia  -     CBC & Differential; Future  Screening for diabetes mellitus  -     Comprehensive Metabolic Panel; Future  Screening for thyroid disorder  -     TSH Rfx On Abnormal To Free T4; Future    She has eaten today, we will complete lipid panel at a later date.      Return in about 1 month (around 10/23/2019) for Follow-up for chest and abdominal pain.

## 2019-09-23 NOTE — PATIENT INSTRUCTIONS
-Instructed to seek emergent treatment if chest pain or tightness, diaphoresis, headache with visual changes, shortness of breath, or change in mental status occur.

## 2019-09-24 LAB
ALBUMIN SERPL-MCNC: 5.2 G/DL (ref 3.5–5.2)
ALBUMIN/GLOB SERPL: 1.9 G/DL
ALP SERPL-CCNC: 46 U/L (ref 39–117)
ALT SERPL W P-5'-P-CCNC: 13 U/L (ref 1–33)
ANION GAP SERPL CALCULATED.3IONS-SCNC: 10.5 MMOL/L (ref 5–15)
AST SERPL-CCNC: 17 U/L (ref 1–32)
BACTERIA SPEC AEROBE CULT: NORMAL
BASOPHILS # BLD AUTO: 0.05 10*3/MM3 (ref 0–0.2)
BASOPHILS NFR BLD AUTO: 1 % (ref 0–1.5)
BILIRUB SERPL-MCNC: 0.9 MG/DL (ref 0.2–1.2)
BUN BLD-MCNC: 10 MG/DL (ref 6–20)
BUN/CREAT SERPL: 13.3 (ref 7–25)
CALCIUM SPEC-SCNC: 9.9 MG/DL (ref 8.6–10.5)
CHLORIDE SERPL-SCNC: 96 MMOL/L (ref 98–107)
CO2 SERPL-SCNC: 29.5 MMOL/L (ref 22–29)
CREAT BLD-MCNC: 0.75 MG/DL (ref 0.57–1)
DEPRECATED RDW RBC AUTO: 37.6 FL (ref 37–54)
EOSINOPHIL # BLD AUTO: 0.41 10*3/MM3 (ref 0–0.4)
EOSINOPHIL NFR BLD AUTO: 7.8 % (ref 0.3–6.2)
ERYTHROCYTE [DISTWIDTH] IN BLOOD BY AUTOMATED COUNT: 11.7 % (ref 12.3–15.4)
GFR SERPL CREATININE-BSD FRML MDRD: 94 ML/MIN/1.73
GLOBULIN UR ELPH-MCNC: 2.7 GM/DL
GLUCOSE BLD-MCNC: 84 MG/DL (ref 65–99)
HCT VFR BLD AUTO: 40.5 % (ref 34–46.6)
HGB BLD-MCNC: 13.7 G/DL (ref 12–15.9)
IMM GRANULOCYTES # BLD AUTO: 0.05 10*3/MM3 (ref 0–0.05)
IMM GRANULOCYTES NFR BLD AUTO: 1 % (ref 0–0.5)
LYMPHOCYTES # BLD AUTO: 1.45 10*3/MM3 (ref 0.7–3.1)
LYMPHOCYTES NFR BLD AUTO: 27.7 % (ref 19.6–45.3)
MCH RBC QN AUTO: 30 PG (ref 26.6–33)
MCHC RBC AUTO-ENTMCNC: 33.8 G/DL (ref 31.5–35.7)
MCV RBC AUTO: 88.6 FL (ref 79–97)
MONOCYTES # BLD AUTO: 0.51 10*3/MM3 (ref 0.1–0.9)
MONOCYTES NFR BLD AUTO: 9.7 % (ref 5–12)
NEUTROPHILS # BLD AUTO: 2.77 10*3/MM3 (ref 1.7–7)
NEUTROPHILS NFR BLD AUTO: 52.8 % (ref 42.7–76)
NRBC BLD AUTO-RTO: 0 /100 WBC (ref 0–0.2)
PLATELET # BLD AUTO: 310 10*3/MM3 (ref 140–450)
PMV BLD AUTO: 10.3 FL (ref 6–12)
POTASSIUM BLD-SCNC: 4.5 MMOL/L (ref 3.5–5.2)
PROT SERPL-MCNC: 7.9 G/DL (ref 6–8.5)
RBC # BLD AUTO: 4.57 10*6/MM3 (ref 3.77–5.28)
SODIUM BLD-SCNC: 136 MMOL/L (ref 136–145)
TSH SERPL DL<=0.05 MIU/L-ACNC: 1.18 UIU/ML (ref 0.27–4.2)
WBC NRBC COR # BLD: 5.24 10*3/MM3 (ref 3.4–10.8)

## 2020-04-07 ENCOUNTER — TELEMEDICINE (OUTPATIENT)
Dept: FAMILY MEDICINE CLINIC | Facility: CLINIC | Age: 25
End: 2020-04-07

## 2020-04-07 DIAGNOSIS — T78.40XA ALLERGIC REACTION, INITIAL ENCOUNTER: Primary | ICD-10-CM

## 2020-04-07 DIAGNOSIS — J45.909 ASTHMA, UNSPECIFIED ASTHMA SEVERITY, UNSPECIFIED WHETHER COMPLICATED, UNSPECIFIED WHETHER PERSISTENT: ICD-10-CM

## 2020-04-07 PROCEDURE — 99213 OFFICE O/P EST LOW 20 MIN: CPT | Performed by: PHYSICIAN ASSISTANT

## 2020-04-07 RX ORDER — METHYLPREDNISOLONE 4 MG/1
TABLET ORAL
Qty: 21 EACH | Refills: 0 | Status: SHIPPED | OUTPATIENT
Start: 2020-04-07 | End: 2020-07-27

## 2020-04-07 RX ORDER — BUDESONIDE AND FORMOTEROL FUMARATE DIHYDRATE 160; 4.5 UG/1; UG/1
2 AEROSOL RESPIRATORY (INHALATION) 2 TIMES DAILY PRN
Qty: 10.2 G | Refills: 11 | Status: SHIPPED | OUTPATIENT
Start: 2020-04-07 | End: 2022-03-08 | Stop reason: SDUPTHER

## 2020-04-07 NOTE — PATIENT INSTRUCTIONS
Recommend neosporin on your the irritation on your lip.  Take steroid pack (recommend taking in the morning as it can cause sleep issues).  Zyrtec 10 mg in the morning to help with allergic reaction.  Benadryl at night.    Call if symptoms worsen or change.

## 2020-04-07 NOTE — PROGRESS NOTES
Chief Complaint   Patient presents with   • Allergic Reaction       HPI      Marilu Salvador is a 24 y.o. female who presents for Allergic Reaction.  Patient used N95 mask yesterday and had swelling in her lips after taking the mask off.  No swelling of tongue or shortness of breath.  The swelling has improved although is not completely normal.  Has small skin irritation on right upper lip as well.  Applied multiple products last night to lips and took benadryl.  Patient contacted employee health and they are looking into situation and possible mask change.  Has asthma and uses symbicort daily.  No known allergies.      Past Medical History:   Diagnosis Date   • Asthma    • GERD (gastroesophageal reflux disease)    • Kidney stones        Past Surgical History:   Procedure Laterality Date   • TONSILLECTOMY     • UMBILICAL HERNIA REPAIR     • WISDOM TOOTH EXTRACTION         Family History   Problem Relation Age of Onset   • Arthritis Mother    • Thyroid disease Mother    • Hypertension Father        Social History     Socioeconomic History   • Marital status: Single     Spouse name: Not on file   • Number of children: Not on file   • Years of education: Not on file   • Highest education level: Not on file   Tobacco Use   • Smoking status: Never Smoker   • Smokeless tobacco: Never Used   Substance and Sexual Activity   • Alcohol use: Yes     Comment: 2-3 per week   • Drug use: No   • Sexual activity: Defer       No Known Allergies    ROS    Review of Systems   Constitutional: Negative for chills, diaphoresis, fatigue and fever.   HENT: Positive for mouth sores. Negative for trouble swallowing.    Respiratory: Negative for cough, shortness of breath and wheezing.    Skin: Positive for color change. Negative for rash.   Psychiatric/Behavioral: Negative for sleep disturbance and stress.       There were no vitals filed for this visit.  There is no height or weight on file to calculate BMI.    Current Outpatient Medications  on File Prior to Visit   Medication Sig Dispense Refill   • albuterol sulfate  (90 Base) MCG/ACT inhaler Inhale 2 puffs Every 4 (Four) Hours As Needed for Wheezing. 18 g 11   • azelastine (ASTELIN) 0.1 % nasal spray Every 12 (Twelve) Hours.     • ipratropium-albuterol (DUO-NEB) 0.5-2.5 mg/3 ml nebulizer Take 3 mL by nebulization Every 4 (Four) Hours As Needed for Wheezing. 360 mL 0   • levocetirizine (XYZAL) 5 MG tablet Xyzal 5 mg tablet   Take 1 tablet every day by oral route for 30 days.     • levonorgestrel (MIRENA, 52 MG,) 20 MCG/24HR IUD 1 each by Intrauterine route 1 (One) Time.     • [DISCONTINUED] budesonide-formoterol (Symbicort) 160-4.5 MCG/ACT inhaler Inhale 2 puffs 2 (Two) Times a Day for  (shortness of breath). 10.2 g 11     No current facility-administered medications on file prior to visit.        Results for orders placed or performed in visit on 09/23/19   Urine Culture - Urine, Urine, Clean Catch   Result Value Ref Range    Urine Culture <25,000 CFU/mL Mixed Angie Isolated    Comprehensive Metabolic Panel   Result Value Ref Range    Glucose 84 65 - 99 mg/dL    BUN 10 6 - 20 mg/dL    Creatinine 0.75 0.57 - 1.00 mg/dL    Sodium 136 136 - 145 mmol/L    Potassium 4.5 3.5 - 5.2 mmol/L    Chloride 96 (L) 98 - 107 mmol/L    CO2 29.5 (H) 22.0 - 29.0 mmol/L    Calcium 9.9 8.6 - 10.5 mg/dL    Total Protein 7.9 6.0 - 8.5 g/dL    Albumin 5.20 3.50 - 5.20 g/dL    ALT (SGPT) 13 1 - 33 U/L    AST (SGOT) 17 1 - 32 U/L    Alkaline Phosphatase 46 39 - 117 U/L    Total Bilirubin 0.9 0.2 - 1.2 mg/dL    eGFR Non African Amer 94 >60 mL/min/1.73    Globulin 2.7 gm/dL    A/G Ratio 1.9 g/dL    BUN/Creatinine Ratio 13.3 7.0 - 25.0    Anion Gap 10.5 5.0 - 15.0 mmol/L   TSH Rfx On Abnormal To Free T4   Result Value Ref Range    TSH 1.180 0.270 - 4.200 uIU/mL   CBC Auto Differential   Result Value Ref Range    WBC 5.24 3.40 - 10.80 10*3/mm3    RBC 4.57 3.77 - 5.28 10*6/mm3    Hemoglobin 13.7 12.0 - 15.9 g/dL     Hematocrit 40.5 34.0 - 46.6 %    MCV 88.6 79.0 - 97.0 fL    MCH 30.0 26.6 - 33.0 pg    MCHC 33.8 31.5 - 35.7 g/dL    RDW 11.7 (L) 12.3 - 15.4 %    RDW-SD 37.6 37.0 - 54.0 fl    MPV 10.3 6.0 - 12.0 fL    Platelets 310 140 - 450 10*3/mm3    Neutrophil % 52.8 42.7 - 76.0 %    Lymphocyte % 27.7 19.6 - 45.3 %    Monocyte % 9.7 5.0 - 12.0 %    Eosinophil % 7.8 (H) 0.3 - 6.2 %    Basophil % 1.0 0.0 - 1.5 %    Immature Grans % 1.0 (H) 0.0 - 0.5 %    Neutrophils, Absolute 2.77 1.70 - 7.00 10*3/mm3    Lymphocytes, Absolute 1.45 0.70 - 3.10 10*3/mm3    Monocytes, Absolute 0.51 0.10 - 0.90 10*3/mm3    Eosinophils, Absolute 0.41 (H) 0.00 - 0.40 10*3/mm3    Basophils, Absolute 0.05 0.00 - 0.20 10*3/mm3    Immature Grans, Absolute 0.05 0.00 - 0.05 10*3/mm3    nRBC 0.0 0.0 - 0.2 /100 WBC   POC Urinalysis Dipstick, Automated   Result Value Ref Range    Color Yellow Yellow, Straw, Dark Yellow, [place]    Clarity, UA Clear Clear    Specific Gravity  1.015 1.005 - 1.030    pH, Urine 7.0 5.0 - 8.0    Leukocytes Negative Negative    Nitrite, UA Negative Negative    Protein, POC Trace (A) Negative mg/dL    Glucose, UA Negative Negative, 1000 mg/dL (3+) mg/dL    Ketones, UA Negative Negative    Urobilinogen, UA Normal Normal    Bilirubin Negative Negative    Blood, UA Negative Negative   POCT pregnancy, urine   Result Value Ref Range    HCG, Urine, QL Negative Negative    Lot Number [id number]     Internal Positive Control Positive     Internal Negative Control Negative        PE    Physical Exam   Constitutional: She appears well-developed and well-nourished. She is active and cooperative. No distress.   HENT:   Head: Normocephalic and atraumatic.       Eyes: EOM are normal.   Neck: Normal range of motion.   Neurological: She is alert.   Skin: Skin is warm. She is not diaphoretic. No erythema.   Psychiatric: She has a normal mood and affect. Her speech is normal and behavior is normal. Judgment and thought content normal. She is not  actively hallucinating. She is attentive.        A/P    Marilu was seen today for allergic reaction.    Diagnoses and all orders for this visit:    Allergic reaction, initial encounter  -     methylPREDNISolone (Medrol) 4 MG tablet; Take as directed on package instructions.  Had lip swelling with lesion on right upper lip after wearing N95 mask during heart cath procedure.  No trouble breathing, swollen tongue or shortness of breath.  Contacted employee health about mask.  Still recommend she wear N95 when needed, notify me if she has another reaction.  Neosporin on skin lesion.  Benadryl at night, zyrtec during the day.  Medrol dosepak.    Asthma, unspecified asthma severity, unspecified whether complicated, unspecified whether persistent  -     budesonide-formoterol (Symbicort) 160-4.5 MCG/ACT inhaler; Inhale 2 puffs 2 (Two) Times a Day for  (shortness of breath).           Plan of care reviewed with patient at the conclusion of today's visit. Education was provided regarding diagnosis, management and any prescribed or recommended OTC medications.  Patient verbalizes understanding of and agreement with management plan.    No follow-ups on file.     Monae Iglesias PA-C

## 2020-07-27 ENCOUNTER — OFFICE VISIT (OUTPATIENT)
Dept: FAMILY MEDICINE CLINIC | Facility: CLINIC | Age: 25
End: 2020-07-27

## 2020-07-27 ENCOUNTER — LAB (OUTPATIENT)
Dept: LAB | Facility: HOSPITAL | Age: 25
End: 2020-07-27

## 2020-07-27 VITALS
SYSTOLIC BLOOD PRESSURE: 118 MMHG | DIASTOLIC BLOOD PRESSURE: 74 MMHG | HEIGHT: 70 IN | HEART RATE: 66 BPM | WEIGHT: 151 LBS | BODY MASS INDEX: 21.62 KG/M2 | OXYGEN SATURATION: 98 %

## 2020-07-27 DIAGNOSIS — Z11.59 ENCOUNTER FOR HEPATITIS C SCREENING TEST FOR LOW RISK PATIENT: ICD-10-CM

## 2020-07-27 DIAGNOSIS — R53.83 FATIGUE, UNSPECIFIED TYPE: ICD-10-CM

## 2020-07-27 DIAGNOSIS — R07.9 CHEST PAIN, UNSPECIFIED TYPE: Primary | ICD-10-CM

## 2020-07-27 DIAGNOSIS — E55.9 VITAMIN D DEFICIENCY: ICD-10-CM

## 2020-07-27 DIAGNOSIS — K21.9 GASTROESOPHAGEAL REFLUX DISEASE, ESOPHAGITIS PRESENCE NOT SPECIFIED: ICD-10-CM

## 2020-07-27 PROBLEM — K90.41 GLUTEN INTOLERANCE: Status: ACTIVE | Noted: 2020-07-27

## 2020-07-27 LAB
BASOPHILS # BLD AUTO: 0.04 10*3/MM3 (ref 0–0.2)
BASOPHILS NFR BLD AUTO: 0.7 % (ref 0–1.5)
DEPRECATED RDW RBC AUTO: 35.5 FL (ref 37–54)
EOSINOPHIL # BLD AUTO: 0.09 10*3/MM3 (ref 0–0.4)
EOSINOPHIL NFR BLD AUTO: 1.5 % (ref 0.3–6.2)
ERYTHROCYTE [DISTWIDTH] IN BLOOD BY AUTOMATED COUNT: 11.5 % (ref 12.3–15.4)
HCT VFR BLD AUTO: 37.9 % (ref 34–46.6)
HGB BLD-MCNC: 13.5 G/DL (ref 12–15.9)
IMM GRANULOCYTES # BLD AUTO: 0.03 10*3/MM3 (ref 0–0.05)
IMM GRANULOCYTES NFR BLD AUTO: 0.5 % (ref 0–0.5)
LYMPHOCYTES # BLD AUTO: 1.1 10*3/MM3 (ref 0.7–3.1)
LYMPHOCYTES NFR BLD AUTO: 18.4 % (ref 19.6–45.3)
MCH RBC QN AUTO: 30.8 PG (ref 26.6–33)
MCHC RBC AUTO-ENTMCNC: 35.6 G/DL (ref 31.5–35.7)
MCV RBC AUTO: 86.3 FL (ref 79–97)
MONOCYTES # BLD AUTO: 0.35 10*3/MM3 (ref 0.1–0.9)
MONOCYTES NFR BLD AUTO: 5.9 % (ref 5–12)
NEUTROPHILS NFR BLD AUTO: 4.36 10*3/MM3 (ref 1.7–7)
NEUTROPHILS NFR BLD AUTO: 73 % (ref 42.7–76)
NRBC BLD AUTO-RTO: 0 /100 WBC (ref 0–0.2)
PLATELET # BLD AUTO: 295 10*3/MM3 (ref 140–450)
PMV BLD AUTO: 10 FL (ref 6–12)
RBC # BLD AUTO: 4.39 10*6/MM3 (ref 3.77–5.28)
WBC # BLD AUTO: 5.97 10*3/MM3 (ref 3.4–10.8)

## 2020-07-27 PROCEDURE — 93000 ELECTROCARDIOGRAM COMPLETE: CPT | Performed by: PHYSICIAN ASSISTANT

## 2020-07-27 PROCEDURE — 82306 VITAMIN D 25 HYDROXY: CPT

## 2020-07-27 PROCEDURE — 84443 ASSAY THYROID STIM HORMONE: CPT

## 2020-07-27 PROCEDURE — 80053 COMPREHEN METABOLIC PANEL: CPT

## 2020-07-27 PROCEDURE — 83013 H PYLORI (C-13) BREATH: CPT

## 2020-07-27 PROCEDURE — 85025 COMPLETE CBC W/AUTO DIFF WBC: CPT

## 2020-07-27 PROCEDURE — 99214 OFFICE O/P EST MOD 30 MIN: CPT | Performed by: PHYSICIAN ASSISTANT

## 2020-07-27 PROCEDURE — 36415 COLL VENOUS BLD VENIPUNCTURE: CPT

## 2020-07-27 PROCEDURE — 86803 HEPATITIS C AB TEST: CPT

## 2020-07-27 NOTE — PROGRESS NOTES
Chief Complaint   Patient presents with   • Chest Pain       HPI      Marilu Salvador is a 24 y.o. female who presents for Chest Pain.  Patient reports chest pain that is episodic and sharp.  Started in September 2019 and has been ongoing since then.  She describes the pain as pressure.  Occurs mostly at night when she is resting, sitting and lying.  Woke her up last night and that is what prompted her to come to the office today.  Has had issues with reflux in the past.  Took omeprazole prn and this was helpful, hasn't taken this in a month.  No history of EGD or H. Pylori test.  No shortness of breath or palpitations.  ECG was normal in the past.      Past Medical History:   Diagnosis Date   • Asthma    • GERD (gastroesophageal reflux disease)    • Kidney stones        Past Surgical History:   Procedure Laterality Date   • TONSILLECTOMY     • UMBILICAL HERNIA REPAIR     • WISDOM TOOTH EXTRACTION         Family History   Problem Relation Age of Onset   • Arthritis Mother    • Thyroid disease Mother    • Hypertension Father        Social History     Socioeconomic History   • Marital status: Single     Spouse name: Not on file   • Number of children: Not on file   • Years of education: Not on file   • Highest education level: Not on file   Tobacco Use   • Smoking status: Never Smoker   • Smokeless tobacco: Never Used   Substance and Sexual Activity   • Alcohol use: Yes     Comment: 2-3 per week   • Drug use: No   • Sexual activity: Defer       No Known Allergies    ROS    Review of Systems   Constitutional: Positive for fatigue. Negative for chills and fever.   Respiratory: Negative for cough, shortness of breath and wheezing.    Cardiovascular: Positive for chest pain. Negative for palpitations and leg swelling.   Gastrointestinal: Positive for GERD. Negative for nausea and vomiting.   Psychiatric/Behavioral: Positive for agitation, sleep disturbance and stress. Negative for depressed mood. The patient is not  "nervous/anxious.        Vitals:    07/27/20 1222   BP: 118/74   BP Location: Left arm   Patient Position: Sitting   Cuff Size: Adult   Pulse: 66   SpO2: 98%   Weight: 68.5 kg (151 lb)   Height: 177.8 cm (70\")     Body mass index is 21.67 kg/m².    Current Outpatient Medications on File Prior to Visit   Medication Sig Dispense Refill   • albuterol sulfate  (90 Base) MCG/ACT inhaler Inhale 2 puffs Every 4 (Four) Hours As Needed for Wheezing. 18 g 11   • budesonide-formoterol (Symbicort) 160-4.5 MCG/ACT inhaler Inhale 2 puffs 2 (Two) Times a Day for  (shortness of breath). 10.2 g 11   • ipratropium-albuterol (DUO-NEB) 0.5-2.5 mg/3 ml nebulizer Take 3 mL by nebulization Every 4 (Four) Hours As Needed for Wheezing. 360 mL 0   • levocetirizine (XYZAL) 5 MG tablet Xyzal 5 mg tablet   Take 1 tablet every day by oral route for 30 days.     • levonorgestrel (MIRENA, 52 MG,) 20 MCG/24HR IUD 1 each by Intrauterine route 1 (One) Time.     • azelastine (ASTELIN) 0.1 % nasal spray Every 12 (Twelve) Hours.     • [DISCONTINUED] methylPREDNISolone (Medrol) 4 MG tablet Take as directed on package instructions. 21 each 0     No current facility-administered medications on file prior to visit.        Results for orders placed or performed in visit on 09/23/19   Urine Culture - Urine, Urine, Clean Catch   Result Value Ref Range    Urine Culture <25,000 CFU/mL Mixed Angie Isolated    Comprehensive Metabolic Panel   Result Value Ref Range    Glucose 84 65 - 99 mg/dL    BUN 10 6 - 20 mg/dL    Creatinine 0.75 0.57 - 1.00 mg/dL    Sodium 136 136 - 145 mmol/L    Potassium 4.5 3.5 - 5.2 mmol/L    Chloride 96 (L) 98 - 107 mmol/L    CO2 29.5 (H) 22.0 - 29.0 mmol/L    Calcium 9.9 8.6 - 10.5 mg/dL    Total Protein 7.9 6.0 - 8.5 g/dL    Albumin 5.20 3.50 - 5.20 g/dL    ALT (SGPT) 13 1 - 33 U/L    AST (SGOT) 17 1 - 32 U/L    Alkaline Phosphatase 46 39 - 117 U/L    Total Bilirubin 0.9 0.2 - 1.2 mg/dL    eGFR Non African Amer 94 >60 mL/min/1.73 "    Globulin 2.7 gm/dL    A/G Ratio 1.9 g/dL    BUN/Creatinine Ratio 13.3 7.0 - 25.0    Anion Gap 10.5 5.0 - 15.0 mmol/L   TSH Rfx On Abnormal To Free T4   Result Value Ref Range    TSH 1.180 0.270 - 4.200 uIU/mL   CBC Auto Differential   Result Value Ref Range    WBC 5.24 3.40 - 10.80 10*3/mm3    RBC 4.57 3.77 - 5.28 10*6/mm3    Hemoglobin 13.7 12.0 - 15.9 g/dL    Hematocrit 40.5 34.0 - 46.6 %    MCV 88.6 79.0 - 97.0 fL    MCH 30.0 26.6 - 33.0 pg    MCHC 33.8 31.5 - 35.7 g/dL    RDW 11.7 (L) 12.3 - 15.4 %    RDW-SD 37.6 37.0 - 54.0 fl    MPV 10.3 6.0 - 12.0 fL    Platelets 310 140 - 450 10*3/mm3    Neutrophil % 52.8 42.7 - 76.0 %    Lymphocyte % 27.7 19.6 - 45.3 %    Monocyte % 9.7 5.0 - 12.0 %    Eosinophil % 7.8 (H) 0.3 - 6.2 %    Basophil % 1.0 0.0 - 1.5 %    Immature Grans % 1.0 (H) 0.0 - 0.5 %    Neutrophils, Absolute 2.77 1.70 - 7.00 10*3/mm3    Lymphocytes, Absolute 1.45 0.70 - 3.10 10*3/mm3    Monocytes, Absolute 0.51 0.10 - 0.90 10*3/mm3    Eosinophils, Absolute 0.41 (H) 0.00 - 0.40 10*3/mm3    Basophils, Absolute 0.05 0.00 - 0.20 10*3/mm3    Immature Grans, Absolute 0.05 0.00 - 0.05 10*3/mm3    nRBC 0.0 0.0 - 0.2 /100 WBC   POC Urinalysis Dipstick, Automated   Result Value Ref Range    Color Yellow Yellow, Straw, Dark Yellow, [place]    Clarity, UA Clear Clear    Specific Gravity  1.015 1.005 - 1.030    pH, Urine 7.0 5.0 - 8.0    Leukocytes Negative Negative    Nitrite, UA Negative Negative    Protein, POC Trace (A) Negative mg/dL    Glucose, UA Negative Negative, 1000 mg/dL (3+) mg/dL    Ketones, UA Negative Negative    Urobilinogen, UA Normal Normal    Bilirubin Negative Negative    Blood, UA Negative Negative   POCT pregnancy, urine   Result Value Ref Range    HCG, Urine, QL Negative Negative    Lot Number [id number]     Internal Positive Control Positive     Internal Negative Control Negative          PE    Physical Exam   Constitutional: Vital signs are normal. She appears well-developed and  well-nourished. She is active and cooperative. She does not appear ill. No distress. She is not overweight.  HENT:   Head: Normocephalic and atraumatic.   Eyes: EOM are normal.   Neck: Normal range of motion.   Cardiovascular: Normal rate, regular rhythm and normal heart sounds.   Pulmonary/Chest: Effort normal and breath sounds normal.   Musculoskeletal: Normal range of motion. She exhibits no edema.   Neurological: She is alert.   Skin: Skin is warm. She is not diaphoretic. No erythema.   Psychiatric: She has a normal mood and affect. Her speech is normal and behavior is normal. Judgment and thought content normal. She is not actively hallucinating. She is attentive.   Vitals reviewed.       ECG 12 Lead  Date/Time: 7/27/2020 3:20 PM  Performed by: Monae Iglesias PA-C  Authorized by: Monae Iglesias PA-C   Comparison: compared with previous ECG from 9/23/2019  Similar to previous ECG  Comparison to previous ECG: Similar except for episode of ectopic atrial arrhythmia  Rhythm: sinus rhythm    Clinical impression: non-specific ECG          A/P    Marilu was seen today for chest pain.    Diagnoses and all orders for this visit:    Chest pain, unspecified type  -     ECG 12 Lead  Episodic, worse at night.  Woke her up from sleeping last night.  ECG is reassuring.  Think this may be related to reflux.  Will check labs, if they are reassuring would consider referral to heart clinic as symptoms are ongoing from September 2019 and worsening.    Fatigue, unspecified type  -     CBC Auto Differential; Future  -     Comprehensive Metabolic Panel; Future  -     TSH Rfx On Abnormal To Free T4; Future    Gastroesophageal reflux disease, esophagitis presence not specified  -     H. Pylori Breath Test - Breath, Lung; Future    Vitamin D deficiency    Encounter for hepatitis C screening test for low risk patient  -     Hepatitis C Antibody; Future         Plan of care reviewed with patient at the conclusion of  today's visit. Education was provided regarding diagnosis, management and any prescribed or recommended OTC medications.  Patient verbalizes understanding of and agreement with management plan.    No follow-ups on file.     Monae Iglesias PA-C    Answers for HPI/ROS submitted by the patient on 7/25/2020   What is the primary reason for your visit?: Other  Please describe your symptoms.: Ongoing left sided/mid chest pain  Have you had these symptoms before?: Yes  How long have you been having these symptoms?: Greater than 2 weeks  Please describe any probable cause for these symptoms. : N/A. Does not occur at any specific time (like after eating).

## 2020-07-28 LAB
25(OH)D3 SERPL-MCNC: 31.1 NG/ML (ref 30–100)
ALBUMIN SERPL-MCNC: 4.9 G/DL (ref 3.5–5.2)
ALBUMIN/GLOB SERPL: 1.8 G/DL
ALP SERPL-CCNC: 35 U/L (ref 39–117)
ALT SERPL W P-5'-P-CCNC: 15 U/L (ref 1–33)
ANION GAP SERPL CALCULATED.3IONS-SCNC: 10.3 MMOL/L (ref 5–15)
AST SERPL-CCNC: 17 U/L (ref 1–32)
BILIRUB SERPL-MCNC: 1.2 MG/DL (ref 0–1.2)
BUN SERPL-MCNC: 8 MG/DL (ref 6–20)
BUN/CREAT SERPL: 10.8 (ref 7–25)
CALCIUM SPEC-SCNC: 10.1 MG/DL (ref 8.6–10.5)
CHLORIDE SERPL-SCNC: 101 MMOL/L (ref 98–107)
CO2 SERPL-SCNC: 25.7 MMOL/L (ref 22–29)
CREAT SERPL-MCNC: 0.74 MG/DL (ref 0.57–1)
GFR SERPL CREATININE-BSD FRML MDRD: 95 ML/MIN/1.73
GLOBULIN UR ELPH-MCNC: 2.8 GM/DL
GLUCOSE SERPL-MCNC: 93 MG/DL (ref 65–99)
HCV AB SER DONR QL: NORMAL
POTASSIUM SERPL-SCNC: 4 MMOL/L (ref 3.5–5.2)
PROT SERPL-MCNC: 7.7 G/DL (ref 6–8.5)
SODIUM SERPL-SCNC: 137 MMOL/L (ref 136–145)
TSH SERPL DL<=0.05 MIU/L-ACNC: 1.24 UIU/ML (ref 0.27–4.2)

## 2020-07-29 LAB — UREA BREATH TEST QL: NEGATIVE

## 2020-07-29 RX ORDER — FAMOTIDINE 40 MG/1
40 TABLET, FILM COATED ORAL NIGHTLY
Qty: 30 TABLET | Refills: 5 | Status: SHIPPED | OUTPATIENT
Start: 2020-07-29

## 2022-03-04 ENCOUNTER — HOSPITAL ENCOUNTER (EMERGENCY)
Facility: HOSPITAL | Age: 27
Discharge: HOME | End: 2022-03-05
Attending: EMERGENCY MEDICINE | Admitting: EMERGENCY MEDICINE

## 2022-03-04 VITALS
HEIGHT: 71 IN | BODY MASS INDEX: 20.3 KG/M2 | WEIGHT: 145 LBS | HEART RATE: 100 BPM | DIASTOLIC BLOOD PRESSURE: 93 MMHG | RESPIRATION RATE: 18 BRPM | TEMPERATURE: 98.2 F | SYSTOLIC BLOOD PRESSURE: 145 MMHG | OXYGEN SATURATION: 99 %

## 2022-03-04 DIAGNOSIS — S01.01XA LACERATION OF SCALP, INITIAL ENCOUNTER: Primary | ICD-10-CM

## 2022-03-04 DIAGNOSIS — S09.90XA INJURY OF HEAD, INITIAL ENCOUNTER: ICD-10-CM

## 2022-03-04 DIAGNOSIS — R51.9 ACUTE NONINTRACTABLE HEADACHE, UNSPECIFIED HEADACHE TYPE: ICD-10-CM

## 2022-03-04 PROCEDURE — 99282 EMERGENCY DEPT VISIT SF MDM: CPT

## 2022-03-05 PROCEDURE — 90471 IMMUNIZATION ADMIN: CPT | Performed by: EMERGENCY MEDICINE

## 2022-03-05 PROCEDURE — 25010000002 TETANUS-DIPHTH-ACELL PERTUSSIS 5-2.5-18.5 LF-MCG/0.5 SUSPENSION PREFILLED SYRINGE: Performed by: EMERGENCY MEDICINE

## 2022-03-05 PROCEDURE — 90715 TDAP VACCINE 7 YRS/> IM: CPT | Performed by: EMERGENCY MEDICINE

## 2022-03-05 RX ADMIN — TETANUS TOXOID, REDUCED DIPHTHERIA TOXOID AND ACELLULAR PERTUSSIS VACCINE, ADSORBED 0.5 ML: 5; 2.5; 8; 8; 2.5 SUSPENSION INTRAMUSCULAR at 00:17

## 2022-03-05 NOTE — ED PROVIDER NOTES
[place]    EMERGENCY DEPARTMENT ENCOUNTER      Pt Name: Marilu Salvador  MRN: [id number]  YOB: 1995  Date of evaluation: 3/4/2022  Provider: JoseA lberto Hargrove MD    CHIEF COMPLAINT       Chief Complaint   Patient presents with   • Head Laceration         HISTORY OF PRESENT ILLNESS  (Location/Symptom, Timing/Onset, Context/Setting, Quality, Duration, Modifying Factors, Severity.)   Marilu Salvador is a 26 y.o. female who presents to the emergency department following head injury that occurred just prior to arrival.  Patient was at a local bar and a speaker fell off the wall, striking her on the top of the head.  She sustained a small laceration as result of this and is complain about mild aching generalized headache but no neck pain or stiffness.  She denies any extremity paresthesias, weakness, or numbness.  There is no loss of consciousness and patient has had no nausea or vomiting since the incident.  She does not take any antiplatelet or anticoagulant medications.      Nursing notes were reviewed.    REVIEW OF SYSTEMS    (2-9 systems for level 4, 10 or more for level 5)   ROS:  General:  No fevers, no chills, no weakness  Cardiovascular:  No chest pain, no palpitations  Respiratory:  No shortness of breath, no cough, no wheezing  Gastrointestinal:  No pain, no nausea, no vomiting, no diarrhea  Musculoskeletal:  No muscle pain, no joint pain  Skin: Scalp laceration  Neurologic: Headache  Psychiatric:  No anxiety  Genitourinary:  No dysuria, no hematuria    Except as noted above the remainder of the review of systems was reviewed and negative.       PAST MEDICAL HISTORY     Past Medical History:   Diagnosis Date   • Asthma    • GERD (gastroesophageal reflux disease)    • Kidney stones          SURGICAL HISTORY       Past Surgical History:   Procedure Laterality Date   • TONSILLECTOMY     • UMBILICAL HERNIA REPAIR     • WISDOM TOOTH EXTRACTION           CURRENT MEDICATIONS     No current  "facility-administered medications for this encounter.    Current Outpatient Medications:   •  albuterol sulfate  (90 Base) MCG/ACT inhaler, Inhale 2 puffs Every 4 (Four) Hours As Needed for Wheezing., Disp: 18 g, Rfl: 11  •  azelastine (ASTELIN) 0.1 % nasal spray, Every 12 (Twelve) Hours., Disp: , Rfl:   •  budesonide-formoterol (Symbicort) 160-4.5 MCG/ACT inhaler, Inhale 2 puffs 2 (Two) Times a Day for  (shortness of breath)., Disp: 10.2 g, Rfl: 11  •  famotidine (Pepcid) 40 MG tablet, Take 1 tablet by mouth Every Night., Disp: 30 tablet, Rfl: 5  •  ipratropium-albuterol (DUO-NEB) 0.5-2.5 mg/3 ml nebulizer, Take 3 mL by nebulization Every 4 (Four) Hours As Needed for Wheezing., Disp: 360 mL, Rfl: 0  •  levocetirizine (XYZAL) 5 MG tablet, Xyzal 5 mg tablet  Take 1 tablet every day by oral route for 30 days., Disp: , Rfl:   •  levonorgestrel (MIRENA, 52 MG,) 20 MCG/24HR IUD, 1 each by Intrauterine route 1 (One) Time., Disp: , Rfl:     ALLERGIES     Gluten meal and Lavender oil    FAMILY HISTORY       Family History   Problem Relation Age of Onset   • Arthritis Mother    • Thyroid disease Mother    • Hypertension Father           SOCIAL HISTORY       Social History     Socioeconomic History   • Marital status: Single   Tobacco Use   • Smoking status: Never Smoker   • Smokeless tobacco: Never Used   Substance and Sexual Activity   • Alcohol use: Yes     Comment: 2-3 per week   • Drug use: No   • Sexual activity: Defer         PHYSICAL EXAM    (up to 7 for level 4, 8 or more for level 5)     Vitals:    03/04/22 2316   BP: 145/93   BP Location: Left arm   Patient Position: Sitting   Pulse: 100   Resp: 18   Temp: 98.2 °F (36.8 °C)   TempSrc: Oral   SpO2: 99%   Weight: 65.8 kg (145 lb)   Height: 180.3 cm (71\")       Physical Exam  General: Awake, alert, no acute distress.  HEENT: Pupils are equally round and reactive to light, EOMI, conjunctivae clear, sclerae white, there is no injection no icterus.  Oral mucosa is " "moist, no exudate. Uvula is midline. No malocclusion or tenderness over the mandible. There is no hemotympanum, llamas sign, or raccoon eyes.  Neck: Neck is supple, full range of motion, trachea midline. No midline tenderness.  Cardiac: Heart regular rate, rhythm, no murmurs, rubs, or gallops. Peripheral pulses are 2+ throughout.  Lungs: Lungs are clear to auscultation, there is no wheezing, rhonchi, or rales. There is no use of accessory muscles.  Chest wall: There is no tenderness to palpation over the chest wall or over ribs. There are no chest wall ecchymoses.  Abdomen: Abdomen is soft, nontender, nondistended. There are no firm or pulsatile masses, no rebound rigidity or guarding. No abdominal wall ecchymoses.  Musculoskeletal: No deformity or focal bone tenderness.  Neuro: Motor intact, sensory intact, level of consciousness is normal.  Dermatology: There is a 1 cm laceration on the top of the scalp with no active bleeding  Psych: Mentation is grossly normal, cognition is grossly normal. Affect is appropriate.            EMERGENCY DEPARTMENT COURSE and DIFFERENTIAL DIAGNOSIS/MDM:   Vitals:    Vitals:    03/04/22 2316   BP: 145/93   BP Location: Left arm   Patient Position: Sitting   Pulse: 100   Resp: 18   Temp: 98.2 °F (36.8 °C)   TempSrc: Oral   SpO2: 99%   Weight: 65.8 kg (145 lb)   Height: 180.3 cm (71\")       ED Course as of 03/05/22 0613   Sat Mar 05, 2022   0611 [place] CT Head Injury/Trauma Rule from [email or web address]  on 3/5/2022  ** All calculations should be rechecked by clinician prior to use **    RESULT SUMMARY:  CT Unnecessary    The [place] Head CT Rule suggests a head CT is not necessary for this patient (sensitivity % for all intracranial traumatic findings, sensitivity 100% for findings requiring neurosurgical intervention).      INPUTS:  Age  -> 0 = No  Patient on blood thinners -> 0 = No  Seizure after injury -> 0 =  No  GCS  -> 0 = No  Suspected open or depressed skull fracture -> 0 = " "No  Any sign of basilar skull fracture? -> 0 = No  =2 episodes of vomiting -> 0 = No  Age =65 years -> 0 = No  Retrograde amnesia to the event = 30 minutes -> 0 = No  \"Dangerous\" mechanism? -> 0 = No   [NS]      ED Course User Index  [NS] Jose Alberto Hargrove MD       Patient very well-appearing and fully intact on evaluation in the emergency department.  Negative by [place] head CT rule does not require emergent head imaging.  She has no neck pain and no tenderness on examination as well as no neurologic symptoms and do not feel that C-spine imaging is warranted.  No evidence of additional traumatic injury.  Laceration repaired with a single staple and tetanus updated.    I had a discussion with the patient/family regarding diagnosis, diagnostic results, treatment plan, and medications.  The patient/family indicated understanding of these instructions.  I spent adequate time at the bedside preceding discharge necessary to personally discuss the aftercare instructions, giving patient education, providing explanations of the results of our evaluations/findings, and my decision making to assure that the patient/family understand the plan of care.  Time was allotted to answer questions at that time and throughout the ED course.  Emphasis was placed on timely follow-up after discharge.  I also discussed the potential for the development of an acute emergent condition requiring further evaluation, admission, or even surgical intervention. I discussed that we found nothing during the visit today indicating the need for further workup, admission, or the presence of an unstable medical condition.  I encouraged the patient to return to the emergency department immediately for ANY concerns, worsening, new complaints, or if symptoms persist and unable to seek follow-up in a timely fashion.  The patient/family expressed understanding and agreement with this plan.  The patient will follow-up with their PCP in 1-2 days for " reevaluation.       MEDICATIONS ADMINISTERED IN ED:  Medications   Tetanus-Diphth-Acell Pertussis (BOOSTRIX) injection 0.5 mL (0.5 mL Intramuscular Given 3/5/22 0017)       PROCEDURES:    Laceration Repair  Indication: Scalp laceration  Consent: Verbal from patient  Technique: A 1 cm laceration is present on the patient's scalp with the following features: Superficial, linear, no foreign body. There is no evidence of significant vascular, neurologic, or soft tissue compromise. Full functional status is maintained. The laceration was thoroughly irrigated with saline. The wound was carefully inspected and probed to the base and found to have no deep structure involvement or foreign body. The wound was repaired using a single staple in the following technique(s): Skin stapler.  Complications: None          FINAL IMPRESSION      1. Laceration of scalp, initial encounter    2. Acute nonintractable headache, unspecified headache type    3. Injury of head, initial encounter          DISPOSITION/PLAN     ED Disposition     ED Disposition   Discharge    Condition   Stable    Comment   --             PATIENT REFERRED TO:  Provider, No Known  [place] [address]    Schedule an appointment as soon as possible for a visit in 2 days      [facility] Emergency Department  [address] [place], [place] [id number] [phone]    If symptoms worsen      DISCHARGE MEDICATIONS:     Medication List      CONTINUE taking these medications    azelastine 0.1 % nasal spray  Commonly known as: ASTELIN     famotidine 40 MG tablet  Commonly known as: Pepcid  Take 1 tablet by mouth Every Night.     ipratropium-albuterol 0.5-2.5 mg/3 ml nebulizer  Commonly known as: DUO-NEB  Take 3 mL by nebulization Every 4 (Four) Hours As Needed for Wheezing.     Mirena (52 MG) 20 MCG/24HR IUD  Generic drug: levonorgestrel     Symbicort 160-4.5 MCG/ACT inhaler  Generic drug: budesonide-formoterol  Inhale 2  puffs 2 (Two) Times a Day for  (shortness of breath).     Ventolin  (90 Base) MCG/ACT inhaler  Generic drug: albuterol sulfate HFA  Inhale 2 puffs Every 4 (Four) Hours As Needed for Wheezing.     Xyzal 5 MG tablet  Generic drug: levocetirizine                Comment: Please note this report has been produced using speech recognition software.      Jose Alberto Hargrove MD  Attending Emergency Physician               Jose Alberto Hargrove MD  03/05/22 0684

## 2022-03-08 ENCOUNTER — HOSPITAL ENCOUNTER (OUTPATIENT)
Dept: CT IMAGING | Facility: HOSPITAL | Age: 27
Discharge: HOME | End: 2022-03-08
Admitting: PHYSICIAN ASSISTANT

## 2022-03-08 ENCOUNTER — OFFICE VISIT (OUTPATIENT)
Dept: FAMILY MEDICINE CLINIC | Facility: CLINIC | Age: 27
End: 2022-03-08

## 2022-03-08 VITALS
OXYGEN SATURATION: 98 % | HEART RATE: 91 BPM | HEIGHT: 71 IN | SYSTOLIC BLOOD PRESSURE: 120 MMHG | TEMPERATURE: 98.3 F | WEIGHT: 152.8 LBS | BODY MASS INDEX: 21.39 KG/M2 | DIASTOLIC BLOOD PRESSURE: 74 MMHG

## 2022-03-08 DIAGNOSIS — F07.81 POST CONCUSSIVE SYNDROME: ICD-10-CM

## 2022-03-08 DIAGNOSIS — H53.8 BLURRED VISION: ICD-10-CM

## 2022-03-08 DIAGNOSIS — S01.01XD LACERATION OF SCALP, SUBSEQUENT ENCOUNTER: ICD-10-CM

## 2022-03-08 DIAGNOSIS — R41.0 CONFUSION: ICD-10-CM

## 2022-03-08 DIAGNOSIS — S09.90XD TRAUMATIC INJURY OF HEAD, SUBSEQUENT ENCOUNTER: Primary | ICD-10-CM

## 2022-03-08 DIAGNOSIS — S09.90XD TRAUMATIC INJURY OF HEAD, SUBSEQUENT ENCOUNTER: ICD-10-CM

## 2022-03-08 DIAGNOSIS — J45.909 ASTHMA, UNSPECIFIED ASTHMA SEVERITY, UNSPECIFIED WHETHER COMPLICATED, UNSPECIFIED WHETHER PERSISTENT: ICD-10-CM

## 2022-03-08 DIAGNOSIS — G44.311 INTRACTABLE ACUTE POST-TRAUMATIC HEADACHE: ICD-10-CM

## 2022-03-08 PROCEDURE — 70450 CT HEAD/BRAIN W/O DYE: CPT

## 2022-03-08 PROCEDURE — 99214 OFFICE O/P EST MOD 30 MIN: CPT | Performed by: PHYSICIAN ASSISTANT

## 2022-03-08 RX ORDER — LEVOCETIRIZINE DIHYDROCHLORIDE 5 MG/1
TABLET, FILM COATED ORAL
COMMUNITY

## 2022-03-08 RX ORDER — BUDESONIDE AND FORMOTEROL FUMARATE DIHYDRATE 160; 4.5 UG/1; UG/1
2 AEROSOL RESPIRATORY (INHALATION) 2 TIMES DAILY PRN
Qty: 10.2 G | Refills: 11 | Status: SHIPPED | OUTPATIENT
Start: 2022-03-08

## 2022-03-08 RX ORDER — AZELASTINE 1 MG/ML
SPRAY, METERED NASAL EVERY 12 HOURS SCHEDULED
COMMUNITY

## 2022-03-08 NOTE — EXTERNAL PATIENT INSTRUCTIONS
Patient Education   Table of Contents       Post-Concussion Syndrome     To view videos and all your education online visit,   [email or web address]   or scan this QR code with your smartphone.                  Post-Concussion Syndrome        A concussion is a brain injury from a direct hit to the head or body. This hit causes the brain to shake quickly back and forth inside the skull. This can damage brain cells and cause chemical changes in the brain. Concussions are usually not life-threatening but can cause serious symptoms.   Post-concussion syndrome is when symptoms that occur after a concussion last longer than normal. These symptoms can last from weeks to months.     What are the causes?   The cause of this condition is not known. It can happen whether your head injury was mild or severe.   What increases the risk?    You are more likely to develop this condition if:       You are female.       You are a child, teen, or young adult.       You have had a past head injury.       You have a history of headaches.       You have depression or anxiety.       You have loss of consciousness or cannot remember the event (have amnesia of the event).       You have multiple symptoms or severe symptoms at the time of your concussion.     What are the signs or symptoms?    Symptoms of this condition include:      Physical symptoms. You may have:       Headaches.       Tiredness.       Dizziness and weakness.       Blurry vision and sensitivity to light.       Hearing difficulties.       Problems with balance.      Mental and emotional symptoms. You may have:       Memory problems and trouble concentrating.       Difficulty sleeping or staying asleep.       Feelings of irritability.       Anxiety or depression.       Difficulty learning new things.     How is this diagnosed?    This condition may be diagnosed based on:       Your symptoms.       A description of your injury.       Your medical history.        Testing your strength, balance, and nerve function (neurological examination).     Your health care provider may order other tests, including brain imaging such as a CT scan or an MRI, and memory testing (neuropsychological testing).   How is this treated?    Treatment for this condition may depend on your symptoms. Symptoms usually go away on their own over time. Treatments may include:       Medicines for headaches, anxiety, depression, and trouble sleeping (insomnia).       Resting your brain and body for a few days after your injury.      Rehabilitation therapy, such as:       Physical or occupational therapy. This may include exercises to help with balance and dizziness.       Mental health counseling. A form of talk therapy called cognitive behavioral therapy (CBT) can be especially helpful. This therapy helps you set goals and follow up on the changes that you make.       Speech therapy.       Vision therapy. A brain and eye specialist can recommend treatments for vision problems.     Follow these instructions at home:   Medicines         Take over-the-counter and prescription medicines only as told by your health care provider.       Avoid opioid prescription pain medicines when recovering from a concussion.     Activity        Limit your mental activities for the first few days after your injury. This may include not doing the following:       Homework or job-related work.       Complex thinking.       Watching TV, and using a computer or phone.       Playing memory games and puzzles.       Gradually return to your normal activity level. If a certain activity brings on your symptoms, stop or slow down until you can do the activity without it triggering your symptoms.       Limit physical activity, such as exercise or sports, for the first few days after a concussion. Gradually return to normal activity as told by your health care provider.      Rest. Rest helps your brain heal. Make sure you:       Get  plenty of sleep at night. Most adults should get at least 7?9 hours of sleep each night.       Rest during the day. Take naps or rest breaks when you feel tired.      Do not  do high-risk activities that could cause a second concussion, such as riding a bike or playing sports. Having another concussion before the first one has healed can be dangerous.     General instructions           Do not  drink alcohol until your health care provider says that you can.       Keep track of the frequency and the severity of your symptoms. Give this information to your health care provider.       Keep all follow-up visits as directed by your health care provider. This is important. This includes visits with specialists.       Contact a health care provider if:         Your symptoms do not improve.       You have another injury.     Get help right away if you:         Have a severe or worsening headache.       Are confused.       Have trouble staying awake.       Faint.       Vomit.       Have weakness or numbness in any part of your body.       Have a seizure.       Have trouble speaking.     Summary         Post-concussion syndrome is when symptoms that occur after a concussion last longer than normal.       Symptoms usually go away on their own over time. Depending on your symptoms, you may need treatment, such as medicines or rehabilitation therapy.       Rest your brain and body for a few days after your injury. Gradually return to normal activities as told by your health care provider.       Get plenty of sleep, and avoid alcohol and opioid pain medicines while recovering from a concussion.     This information is not intended to replace advice given to you by your health care provider. Make sure you discuss any questions you have with your health care provider.     Document Released: 06/09/2003Document Revised: 12/09/2020Document Reviewed: 12/09/2020     [employer] Patient Education ? 2022 [employer]

## 2022-03-08 NOTE — PROGRESS NOTES
Chief Complaint   Patient presents with   • Head Injury   • Hospital Follow Up Visit     Pt. States she was seen at the ER for Laceration Of Scalp 3/4/22   • Med Refill     Symbicort   • Headache     Pt. States none stop   • Memory Loss       HPI      Marilu Salvador is a 26 y.o. female who presents for Head Injury, Hospital Follow Up Visit (Pt. States she was seen at the ER for Laceration Of Scalp 3/4/22), Med Refill (Symbicort), Headache (Pt. States none stop), and Memory Loss    Patient presents for ER follow-up from 03/04.  She was at a restaurant and a speaker fell and hit her in the head.  She did not lose consciousness.  She went to the [facility] ER and they evaluated her.  She had 1 staple placed in the scalp laceration.  She had a headache and blurred vision at ER.  They decided not to do a CT.  She is not on any anticoagulants.  Since injury, patient has continued to have a headache that does not improve with Tylenol or ibuprofen.  She reports confusion and feeling foggy.  She has ongoing blurred vision.  She is concerned because she is planning on going out of the country to [place] tomorrow.    Past Medical History:   Diagnosis Date   • Asthma    • GERD (gastroesophageal reflux disease)    • Kidney stones        Past Surgical History:   Procedure Laterality Date   • TONSILLECTOMY     • UMBILICAL HERNIA REPAIR     • WISDOM TOOTH EXTRACTION         Family History   Problem Relation Age of Onset   • Arthritis Mother    • Thyroid disease Mother    • Hypertension Father        Social History     Socioeconomic History   • Marital status: Single   Tobacco Use   • Smoking status: Never Smoker   • Smokeless tobacco: Never Used   Vaping Use   • Vaping Use: Never used   Substance and Sexual Activity   • Alcohol use: Yes     Comment: 2-3 per week   • Drug use: No   • Sexual activity: Defer       Allergies   Allergen Reactions   • Gluten Meal GI Intolerance   • Lavender Oil Hives       ROS    Review of Systems  "  Constitutional: Positive for fatigue. Negative for chills and fever.   HENT: Negative for hearing loss.    Eyes: Positive for blurred vision and visual disturbance.   Genitourinary: Negative for dysuria.   Skin: Positive for wound.   Neurological: Positive for dizziness, headache, memory problem and confusion. Negative for seizures, syncope and weakness.   Psychiatric/Behavioral: Negative for sleep disturbance, suicidal ideas and depressed mood. The patient is not nervous/anxious.        Vitals:    03/08/22 0828   BP: 120/74   BP Location: Left arm   Patient Position: Sitting   Cuff Size: Adult   Pulse: 91   Temp: 98.3 °F (36.8 °C)   SpO2: 98%   Weight: 69.3 kg (152 lb 12.8 oz)   Height: 180.3 cm (70.98\")   PainSc:   2     Body mass index is 21.32 kg/m².    Current Outpatient Medications on File Prior to Visit   Medication Sig Dispense Refill   • albuterol sulfate  (90 Base) MCG/ACT inhaler Inhale 2 puffs Every 4 (Four) Hours As Needed for Wheezing. 18 g 11   • azelastine (ASTELIN) 0.1 % nasal spray Every 12 (Twelve) Hours.     • budesonide-formoterol (Symbicort) 160-4.5 MCG/ACT inhaler Inhale 2 puffs 2 (Two) Times a Day for  (shortness of breath). 10.2 g 11   • famotidine (Pepcid) 40 MG tablet Take 1 tablet by mouth Every Night. 30 tablet 5   • ipratropium-albuterol (DUO-NEB) 0.5-2.5 mg/3 ml nebulizer Take 3 mL by nebulization Every 4 (Four) Hours As Needed for Wheezing. 360 mL 0   • levocetirizine (XYZAL) 5 MG tablet Xyzal 5 mg tablet   Take 1 tablet every day by oral route for 30 days.     • levonorgestrel (MIRENA) 20 MCG/24HR IUD 1 each by Intrauterine route 1 (One) Time.     • azelastine (ASTELIN) 0.1 % nasal spray Every 12 (Twelve) Hours.     • levocetirizine (XYZAL) 5 MG tablet Xyzal 5 mg tablet   Take 1 tablet every day by oral route for 30 days.       No current facility-administered medications on file prior to visit.       Results for orders placed or performed in visit on 07/27/20   H. Pylori " Breath Test - Breath, Lung    Specimen: Lung; Breath   Result Value Ref Range    H. pylori Breath Test Negative Negative   CBC Auto Differential    Specimen: Blood   Result Value Ref Range    WBC 5.97 3.40 - 10.80 10*3/mm3    RBC 4.39 3.77 - 5.28 10*6/mm3    Hemoglobin 13.5 12.0 - 15.9 g/dL    Hematocrit 37.9 34.0 - 46.6 %    MCV 86.3 79.0 - 97.0 fL    MCH 30.8 26.6 - 33.0 pg    MCHC 35.6 31.5 - 35.7 g/dL    RDW 11.5 (L) 12.3 - 15.4 %    RDW-SD 35.5 (L) 37.0 - 54.0 fl    MPV 10.0 6.0 - 12.0 fL    Platelets 295 140 - 450 10*3/mm3    Neutrophil % 73.0 42.7 - 76.0 %    Lymphocyte % 18.4 (L) 19.6 - 45.3 %    Monocyte % 5.9 5.0 - 12.0 %    Eosinophil % 1.5 0.3 - 6.2 %    Basophil % 0.7 0.0 - 1.5 %    Immature Grans % 0.5 0.0 - 0.5 %    Neutrophils, Absolute 4.36 1.70 - 7.00 10*3/mm3    Lymphocytes, Absolute 1.10 0.70 - 3.10 10*3/mm3    Monocytes, Absolute 0.35 0.10 - 0.90 10*3/mm3    Eosinophils, Absolute 0.09 0.00 - 0.40 10*3/mm3    Basophils, Absolute 0.04 0.00 - 0.20 10*3/mm3    Immature Grans, Absolute 0.03 0.00 - 0.05 10*3/mm3    nRBC 0.0 0.0 - 0.2 /100 WBC   Comprehensive Metabolic Panel    Specimen: Blood   Result Value Ref Range    Glucose 93 65 - 99 mg/dL    BUN 8 6 - 20 mg/dL    Creatinine 0.74 0.57 - 1.00 mg/dL    Sodium 137 136 - 145 mmol/L    Potassium 4.0 3.5 - 5.2 mmol/L    Chloride 101 98 - 107 mmol/L    CO2 25.7 22.0 - 29.0 mmol/L    Calcium 10.1 8.6 - 10.5 mg/dL    Total Protein 7.7 6.0 - 8.5 g/dL    Albumin 4.90 3.50 - 5.20 g/dL    ALT (SGPT) 15 1 - 33 U/L    AST (SGOT) 17 1 - 32 U/L    Alkaline Phosphatase 35 (L) 39 - 117 U/L    Total Bilirubin 1.2 0.0 - 1.2 mg/dL    eGFR Non African Amer 95 >60 mL/min/1.73    Globulin 2.8 gm/dL    A/G Ratio 1.8 g/dL    BUN/Creatinine Ratio 10.8 7.0 - 25.0    Anion Gap 10.3 5.0 - 15.0 mmol/L   TSH Rfx On Abnormal To Free T4    Specimen: Blood   Result Value Ref Range    TSH 1.240 0.270 - 4.200 uIU/mL   Hepatitis C Antibody    Specimen: Blood   Result Value Ref Range     Hepatitis C Ab Non-Reactive Non-Reactive   Vitamin D 25 Hydroxy    Specimen: Blood   Result Value Ref Range    25 Hydroxy, Vitamin D 31.1 30.0 - 100.0 ng/ml       PE    Physical Exam  Vitals reviewed.   Constitutional:       General: She is not in acute distress.     Appearance: Normal appearance. She is well-developed and normal weight. She is not ill-appearing or diaphoretic.   HENT:      Head: Normocephalic and atraumatic.   Eyes:      Extraocular Movements: Extraocular movements intact.      Conjunctiva/sclera: Conjunctivae normal.      Pupils: Pupils are equal, round, and reactive to light.   Pulmonary:      Effort: No respiratory distress.   Musculoskeletal:         General: Normal range of motion.      Cervical back: Normal range of motion.   Neurological:      General: No focal deficit present.      Mental Status: She is alert.      Comments: CN grossly intact.  Tongue midline.  No facial droop.   strength 5/5 bilaterally.  Ambulating normally.   Psychiatric:         Attention and Perception: Attention and perception normal. She is attentive.         Mood and Affect: Mood and affect normal.         Speech: Speech normal.         Behavior: Behavior normal. Behavior is cooperative.         Thought Content: Thought content normal.         Cognition and Memory: Cognition normal.         Judgment: Judgment normal.          A/P    Diagnoses and all orders for this visit:    1. Traumatic injury of head, subsequent encounter (Primary)  -     CT Head Without Contrast; Future    2. Laceration of scalp, subsequent encounter    3. Intractable acute post-traumatic headache  -     CT Head Without Contrast; Future    4. Confusion  -     CT Head Without Contrast; Future    5. Blurred vision  -     CT Head Without Contrast; Future    6. Post concussive syndrome    Patient had speaker fall on her head on 03/04.  Seen in ER, no imaging at that time.  One staple placed.  Patient reports ongoing headache, dizziness,  nausea, memory changes/confusion and blurred vision.  No LOC, seizures, vomiting, facial droop, vision loss, or extremity weakness.  Her brother  from a head injury.  Family is concerned and she is supposed to go to [place] tomorrow on vacation.  Will order CT head without contrast STAT.  Most likely post-concussive syndrome.  Handout given to abiodun.    Plan of care reviewed with patient at the conclusion of today's visit. Education was provided regarding diagnosis, management and any prescribed or recommended OTC medications.  Patient verbalizes understanding of and agreement with management plan.    No follow-ups on file.     Monae Iglesias PA-C

## 2022-06-04 NOTE — TELEPHONE ENCOUNTER
Patient is requesting refill for Albuterol, needs it ASAP, Rx should be sent to [facility].

## 2022-06-06 RX ORDER — IPRATROPIUM BROMIDE AND ALBUTEROL SULFATE 2.5; .5 MG/3ML; MG/3ML
3 SOLUTION RESPIRATORY (INHALATION) EVERY 4 HOURS PRN
Qty: 360 ML | Refills: 0 | Status: SHIPPED | OUTPATIENT
Start: 2022-06-06 | End: 2022-07-05 | Stop reason: SDUPTHER

## 2022-06-06 NOTE — TELEPHONE ENCOUNTER
Rx Refill Note  Requested Prescriptions     Pending Prescriptions Disp Refills   • ipratropium-albuterol (DUO-NEB) 0.5-2.5 mg/3 ml nebulizer 360 mL 0     Sig: Take 3 mL by nebulization Every 4 (Four) Hours As Needed for Wheezing.      Last office visit with prescribing clinician: 3/8/2022      Next office visit with prescribing clinician: Visit date not found            Erica Luis MA  06/06/22, 09:11 EDT     Medication has not been prescribed by this office called and left vm for patient to return call to schedule appointment     OK FOR HUB TO RELAY MESSAGE AND SCHEDULE APPOINTMENT

## 2022-06-06 NOTE — TELEPHONE ENCOUNTER
Patient returned call, she had received this prescription from our office. previous PCP is no longer in clinic    I have sent refills as requested

## 2022-07-05 RX ORDER — IPRATROPIUM BROMIDE AND ALBUTEROL SULFATE 2.5; .5 MG/3ML; MG/3ML
3 SOLUTION RESPIRATORY (INHALATION) EVERY 4 HOURS PRN
Qty: 360 ML | Refills: 0 | Status: SHIPPED | OUTPATIENT
Start: 2022-07-05

## 2022-12-09 ENCOUNTER — OFFICE VISIT (OUTPATIENT)
Dept: FAMILY MEDICINE CLINIC | Facility: CLINIC | Age: 27
End: 2022-12-09

## 2022-12-09 VITALS
DIASTOLIC BLOOD PRESSURE: 82 MMHG | HEIGHT: 71 IN | SYSTOLIC BLOOD PRESSURE: 122 MMHG | WEIGHT: 154.4 LBS | BODY MASS INDEX: 21.61 KG/M2

## 2022-12-09 DIAGNOSIS — N10 ACUTE PYELONEPHRITIS: Primary | ICD-10-CM

## 2022-12-09 LAB
BILIRUB BLD-MCNC: NEGATIVE MG/DL
CLARITY, POC: ABNORMAL
COLOR UR: ABNORMAL
EXPIRATION DATE: ABNORMAL
GLUCOSE UR STRIP-MCNC: NEGATIVE MG/DL
KETONES UR QL: NEGATIVE
LEUKOCYTE EST, POC: ABNORMAL
Lab: ABNORMAL
NITRITE UR-MCNC: POSITIVE MG/ML
PH UR: 6.5 [PH] (ref 5–8)
PROT UR STRIP-MCNC: NEGATIVE MG/DL
RBC # UR STRIP: NEGATIVE /UL
SP GR UR: 1 (ref 1–1.03)
UROBILINOGEN UR QL: NORMAL

## 2022-12-09 PROCEDURE — 87077 CULTURE AEROBIC IDENTIFY: CPT | Performed by: FAMILY MEDICINE

## 2022-12-09 PROCEDURE — 96372 THER/PROPH/DIAG INJ SC/IM: CPT | Performed by: FAMILY MEDICINE

## 2022-12-09 PROCEDURE — 87086 URINE CULTURE/COLONY COUNT: CPT | Performed by: FAMILY MEDICINE

## 2022-12-09 PROCEDURE — 99214 OFFICE O/P EST MOD 30 MIN: CPT | Performed by: FAMILY MEDICINE

## 2022-12-09 PROCEDURE — 81003 URINALYSIS AUTO W/O SCOPE: CPT | Performed by: FAMILY MEDICINE

## 2022-12-09 PROCEDURE — 87186 SC STD MICRODIL/AGAR DIL: CPT | Performed by: FAMILY MEDICINE

## 2022-12-09 RX ORDER — KETOROLAC TROMETHAMINE 10 MG/1
10 TABLET, FILM COATED ORAL EVERY 6 HOURS PRN
Qty: 20 TABLET | Refills: 0 | Status: SHIPPED | OUTPATIENT
Start: 2022-12-09 | End: 2022-12-14

## 2022-12-09 RX ORDER — SULFAMETHOXAZOLE AND TRIMETHOPRIM 800; 160 MG/1; MG/1
1 TABLET ORAL 2 TIMES DAILY
Qty: 14 TABLET | Refills: 0 | Status: SHIPPED | OUTPATIENT
Start: 2022-12-09 | End: 2022-12-16

## 2022-12-09 RX ORDER — KETOROLAC TROMETHAMINE 30 MG/ML
30 INJECTION, SOLUTION INTRAMUSCULAR; INTRAVENOUS ONCE
Status: COMPLETED | OUTPATIENT
Start: 2022-12-09 | End: 2022-12-09

## 2022-12-09 RX ORDER — ONDANSETRON 4 MG/1
4 TABLET, ORALLY DISINTEGRATING ORAL EVERY 8 HOURS PRN
Qty: 30 TABLET | Refills: 1 | Status: SHIPPED | OUTPATIENT
Start: 2022-12-09

## 2022-12-09 RX ADMIN — KETOROLAC TROMETHAMINE 30 MG: 30 INJECTION, SOLUTION INTRAMUSCULAR; INTRAVENOUS at 16:26

## 2022-12-09 NOTE — PROGRESS NOTES
"Chief Complaint  Urinary Frequency (3 days) and Flank Pain    Subjective        Marilu Salvador presents to [facility]  Urinary Frequency   This is a new problem. The current episode started in the past 7 days. Associated symptoms include chills, flank pain, frequency and nausea. Pertinent negatives include no vomiting.   Flank Pain  This is a new problem. The current episode started in the past 7 days. The problem has been rapidly worsening since onset. Associated symptoms include a fever and pelvic pain.       Flank pain 12/6/22. Unbearable bilateral back pain, worse on left. Constant pressure of needing to urinate. Taking AZO tabs currently with orange urine. Last night felt feverish with chills. Dry heaving, no vomiting. She had kidney stones 10 years ago.                 Review of Systems   Constitutional: Positive for chills and fever.   Gastrointestinal: Positive for nausea. Negative for vomiting.   Genitourinary: Positive for flank pain, frequency, pelvic pain and pelvic pressure.     Objective   Vital Signs:  /82   Ht 180.3 cm (70.98\")   Wt 70 kg (154 lb 6.4 oz)   BMI 21.54 kg/m²   Estimated body mass index is 21.54 kg/m² as calculated from the following:    Height as of this encounter: 180.3 cm (70.98\").    Weight as of this encounter: 70 kg (154 lb 6.4 oz).    BMI is within normal parameters. No other follow-up for BMI required.      Physical Exam  Vitals reviewed.   Constitutional:       General: She is not in acute distress.     Appearance: She is ill-appearing. She is not toxic-appearing.   Cardiovascular:      Rate and Rhythm: Normal rate and regular rhythm.   Pulmonary:      Effort: Pulmonary effort is normal.      Breath sounds: Normal breath sounds.   Abdominal:      Tenderness: There is abdominal tenderness in the periumbilical area and suprapubic area. There is no guarding or rebound.   Musculoskeletal:        Back:    Neurological:      Mental Status: She " is alert.        Result Review :  The following data was reviewed by: Jessi Altman MD on 12/09/2022:           Results for orders placed or performed in visit on 12/09/22   POC Urinalysis Dipstick, Automated    Specimen: Urine   Result Value Ref Range    Color [place] Yellow, Straw, Dark Yellow, [place]    Clarity, UA Slightly Cloudy (A) Clear    Specific Gravity  1.005 1.005 - 1.030    pH, Urine 6.5 5.0 - 8.0    Leukocytes Large (3+) (A) Negative    Nitrite, UA Positive (A) Negative    Protein, POC Negative Negative mg/dL    Glucose, UA Negative Negative mg/dL    Ketones, UA Negative Negative    Urobilinogen, UA Normal Normal, 0.2 E.U./dL    Bilirubin Negative Negative    Blood, UA Negative Negative    Lot Number [id number]     Expiration Date 10/21/2023          .  Administrations This Visit     ketorolac (TORADOL) injection 30 mg     Admin Date  12/09/2022 Action  Given Dose  30 mg Route  Intramuscular Administered By  Erica Luis MA            She tolerated the injection well.      Assessment and Plan   Diagnoses and all orders for this visit:    1. Acute pyelonephritis (Primary)  -     POC Urinalysis Dipstick, Automated  -     Urine Culture - Urine, Urine, Clean Catch; Future  -     Urine Culture - Urine, Urine, Clean Catch  -     sulfamethoxazole-trimethoprim (BACTRIM DS,SEPTRA DS) 800-160 MG per tablet; Take 1 tablet by mouth 2 (Two) Times a Day for 7 days.  Dispense: 14 tablet; Refill: 0  -     ketorolac (TORADOL) injection 30 mg  -     ketorolac (TORADOL) 10 MG tablet; Take 1 tablet by mouth Every 6 (Six) Hours As Needed for Moderate Pain for up to 5 days.  Dispense: 20 tablet; Refill: 0  -     ondansetron ODT (ZOFRAN-ODT) 4 MG disintegrating tablet; Place 1 tablet on the tongue Every 8 (Eight) Hours As Needed for Nausea or Vomiting.  Dispense: 30 tablet; Refill: 1    New.  Treat with antibiotics.  Send urine for culture. Toradol injection given in clinic and then prescription for pills to take as  needed for pain relief.  Zofran as needed for nausea.  Discussed if symptoms worsening to seek care in the emergency department over the weekend.         Follow Up   No follow-ups on file.  Patient was given instructions and counseling regarding her condition or for health maintenance advice. Please see specific information pulled into the AVS if appropriate.     Electronically signed by Jessi Altman MD, 12/09/22, 4:29 PM EST.

## 2022-12-11 LAB — BACTERIA SPEC AEROBE CULT: ABNORMAL

## 2022-12-12 ENCOUNTER — TELEPHONE (OUTPATIENT)
Dept: FAMILY MEDICINE CLINIC | Facility: CLINIC | Age: 27
End: 2022-12-12

## 2022-12-12 NOTE — TELEPHONE ENCOUNTER
Attempted to contact, no answer left message asking for return call    Urine infection has E. coli.  Are you feeling better with antibiotics?  Hub can and document.

## 2022-12-12 NOTE — TELEPHONE ENCOUNTER
Patient reports that she is slightly improving, she is MCC through her antibiotic course and will notify us if sx persist after completed

## 2022-12-12 NOTE — TELEPHONE ENCOUNTER
Received critical results regarding ESBL positive urine culture. Patient has been notified previously

## 2023-03-21 ENCOUNTER — E-VISIT (OUTPATIENT)
Dept: FAMILY MEDICINE CLINIC | Facility: TELEHEALTH | Age: 28
End: 2023-03-21
Payer: COMMERCIAL

## 2023-03-21 PROCEDURE — BRIGHTMDVISIT: Performed by: NURSE PRACTITIONER

## 2023-03-21 NOTE — EXTERNAL PATIENT INSTRUCTIONS
Diagnosis   Urinary tract infection (UTI)   My name is Maura Betts. I'm a healthcare provider at [facility]. After reviewing your interview, I see you have a urinary tract infection (UTI).   Medications   Your pharmacy   [facility] #[id number] [id number] [phone]     Prescription   Nitrofurantoin monohydrate/macrocrystalline (100mg): Take 1 capsule by mouth every 12 hours for 5 days for infection. This medication is an antibiotic. Take it exactly as directed. You must finish the entire course of medication, even if you feel better after taking the first few doses.   Phenazopyridine (200mg): Take 1 tablet by mouth three times a day as needed for 2 days for pain or discomfort associated with your condition.   Fluconazole (150mg): Take 1 tablet by mouth once for 1 day as a single dose. Use this medication only if you develop a yeast infection. If yeast infection symptoms are still present 3 days after taking the first tablet, take the second tablet.    I've given you a prescription dose of phenazopyridine. If it's more affordable or convenient, you may use the equivalent amount of non-prescription phenazopyridine. For example, instead of taking one 200 mg phenazopyridine tablet, you may take two 95 mg phenazopyridine tablets.   About your diagnosis   A UTI is an infection of one or more parts of the urinary tract, most commonly the bladder.   Most UTIs are caused by bacteria (usually E. coli) that travel up the urethra and into the bladder. I see that you have some common signs and symptoms of a UTI:    Frequent urination    Sudden urge to urinate    Symptoms that feel a lot like past UTIs    Mild or moderate pain, pressure, or discomfort in your lower abdomen    Mild back pain    Cloudy urine    Symptoms that began shortly after sexual intercourse   Fortunately, most UTIs aren't serious, and they're easily treated with antibiotics. Make sure you take all of the  antibiotic pills given to you, even if you start to feel better after the first few doses. Otherwise, the UTI might come back.   What to expect   If you follow this treatment plan, you should start to feel better within 1 to 2 days.   When to seek care   Call us at [phone]   with any sudden or unexpected symptoms.    Symptoms that don't improve or get worse in the next 48 hours    Fever that goes above 101F or lasts longer than 24 hours    Shaking or chills    Nausea or vomiting    Severe flank pain (pain in your back or side) or pain that gets worse   Other treatment    Rest and drink plenty of water    Urinate frequently and when you first feel the urge    Place a heating pad on your back or stomach to help relieve some of the discomfort   Prevention    Drink a lot of liquids to help flush bacteria from your system. Water is best. Try for six to eight, 8-ounce glasses a day on a regular basis.    Urinate often and when you first feel the urge. Bacteria can grow when urine stays in the bladder too long. Urinate after sex to flush away bacteria.    After using the toilet, always wipe from front to back. This step is most important after a bowel movement. Wiping from front to back prevents bacteria normally found in stool from entering the urinary tract.   Your provider   Your diagnosis was provided by IDALIA Vega, a member of your trusted care team at [facility].   If you have any questions, call us at [phone]  .

## 2023-03-21 NOTE — E-VISIT TREATED
Chief Complaint: Bladder infection (UTI)   Patient introduction   Patient is 27-year-old female.   Patient has had frequent urination and urinary urgency for 1 to 3 days.   Urine is cloudy with no unusual odor.   Warning. The following may warrant further investigation:    History of pyelonephritis within the last year   Patient-submitted comments Patient writes: UTIs started when I got my IUD taken out. Seems to correlate with my periods. .   Patient did not request an excuse note.   General presentation   Patient has not had a fever. No nausea or vomiting.   Mild abdominal or pelvic pain.   Mild back pain.   Pain in left flank.   The following treatments were helpful for current symptoms: - Acetaminophen   Previous history of UTI. Current symptoms feel exactly the same as previous UTIs. Received treatment for UTI 1 to 3 times in last year. Patient's last use of antibiotics for UTI was over 1 month ago.   Does not remember which antibiotics they have taken for past UTIs.   No known history of yeast infections as a result of taking antibiotics for past UTIs.   History of pyelonephritis within the last year. History of kidney stones, but not within the last year.   Had sexual intercourse in the past week. Does not use diaphragm. No unprotected sexual intercourse with a new partner in the last 2 weeks. Has not been exposed to sexually transmitted infections in the last month.   Patient is not being treated for diabetes mellitus.   Review of red flags/alarm symptoms:    No recent hospitalizations or nursing home care (last 3 months)    No history of renal failure    No recent history of urologic instrumentation    No anatomic abnormalities of the urinary tract    No abnormal vaginal discharge    No visible vaginal sores    No pain with sexual intercourse    No abnormal vaginal bleeding or spotting   Pregnancy/menstrual status/breastfeeding:   Patient is not pregnant. Patient is not breastfeeding. Regarding last  menstrual period, patient writes: Ended 3/17.   Current medications   Currently taking Ventolin  (90 Base) MCG/ACT inhaler, levocetirizine 5 MG tablet, budesonide-formoterol 160-4.5 MCG/ACT inhaler, azelastine 0.1 % nasal spray, and ipratropium-albuterol 0.5-2.5 mg/3 ml nebulizer.   Medication allergies   None.   Medication contraindication review   Not taking ACE inhibitors and ARBs.   No history of anaphylactic reaction to beta-lactams; folate deficiency; G6PD deficiency; arrhythmia; coronary artery disease; megaloblastic anemia; mononucleosis; myasthenia gravis; cholestatic jaundice; oliguria/anuria; and TMP/SMX-associated thrombocytopenia.   No known history of amoxicillin-clavulanate-associated cholestatic jaundice or nitrofurantoin-associated cholestatic jaundice.   Past medical history   Immune conditions: No immunocompromising conditions. No history of cancer.   Assessment   Uncomplicated acute UTI.   This is the likely diagnosis based on patient's symptoms and history, including:    Previous history of UTI    Current symptoms are exactly the same as previous UTIs    Mild pelvic or abdominal pain    Mild back pain    Urine described as cloudy   Plan   Medications:    nitrofurantoin monohydrate/macrocrystals 100 mg capsule RX 100mg 1 cap PO q12h 5d for infection. This medication is an antibiotic. Take it exactly as directed. You must finish the entire course of medication, even if you feel better after taking the first few doses. Amount is 10 cap.    phenazopyridine 200 mg tablet RX 200mg 1 tab PO tid PRN 2d for pain or discomfort associated with your condition. Amount is 6 tab.    fluconazole 150 mg tablet RX 150mg 1 tab PO once 1d as a single dose for vaginal yeast infection. Repeat in 72 hours if symptoms persist. Amount is 2 tab.   The patient's prescriptions will be sent to:   [facility]pharm[facility] #[id number] [address]   Phone: [phone]     Fax: [phone]    Education:    Condition and causes    Prevention    Treatment and self-care    When to call provider   Follow-up:   Patient to follow up as needed for progression or lack of improvement in symptoms within 3d.   ----------   Electronically signed by IDALIA Vega on 2023-03-21 at 18:27PM   ----------   Patient Interview Transcript:   Knowing about your anatomy is important for diagnosing and treating UTIs. The gender we have on file for you is female, but we realize that this might not tell the whole story. Would you like to tell us more about your anatomy?    No   Not selected:    Yes   Which of these symptoms do you have? Select all that apply.    Frequent urination    Sudden urge to urinate and it's hard to hold the urine in   Not selected:    Pain or burning while urinating   How long have you had these symptoms? Select one.    1 to 3 days   Not selected:    Less than 24 hours    4 to 6 days    7 to 10 days    More than 10 days   Since your current symptoms started, has it been difficult to start, stop, or delay urination? Select one.    No   Not selected:    Yes   What color is your urine? Select one.    Cloudy   Not selected:    Clear    Yellow    Pink or red   Does your urine smell strange (like ammonia) or stronger than usual? Select one.    No   Not selected:    Yes   Do you also have any of these symptoms? Select all that apply.    Pain, pressure, or discomfort in the lower abdomen    Back pain   Not selected:    Fever    Nausea    Vomiting    No   How would you describe your lower abdominal pain, pressure, or discomfort? Select one.    Mild; I only notice it when I pay attention to it   Not selected:    Moderate; it's uncomfortable and gets in the way of doing daily tasks    Severe; I can't get comfortable, and it stops me from doing daily tasks   How would you describe your back pain? Select one.    Mild, achy pain   Not selected:    Moderate pain that gets in the way of my daily tasks     Severe, sharp pain that keeps me from my daily tasks   Do you have any flank pain? The flank is the side of the body between the ribs and the hips.    Yes, in my left flank   Not selected:    Yes, in my right flank    Yes, in both my left and right flanks    No   Do you have any of these vaginal symptoms? Select all that apply.    No   Not selected:    Abnormal vaginal itching    Unscheduled or abnormal vaginal bleeding or spotting    Pain during sex    Visible sores on the vagina    Abnormal vaginal discharge   In the past 2 weeks, have you had a medical device or instrument placed in your urinary tract? Examples include catheters, stents, and nephrostomy tubes. Select one.    No   Not selected:    Yes   Have you recently been hospitalized or been a resident of a nursing home or other long-term care facility? This doesn't include emergency room (ER) visits. Select one.    No   Not selected:    Yes, within the last 2 weeks    Yes, within the last 3 months   Have you ever had severe problems with your kidneys, such as kidney failure? Select one.    No   Not selected:    Yes   Kidney stones    More than a year ago   Not selected:    Within the last year    No   Kidney infection (pyelonephritis)    Within the last year   Not selected:    More than a year ago    No   Have you ever been diagnosed with any of these? Select all that apply.    No   Not selected:    Urinary reflux    Bladder diverticula    Single (or horseshoe) kidney    Duplicated urethra   Have you recently held your urine for a long time after you felt the urge to go? Select one.    No   Not selected:    Yes   Have you recently avoided eating or drinking so you wouldn't have the urge to urinate as often? Select one.    No   Not selected:    Yes   Do you use a diaphragm? Select one.    No   Not selected:    Yes   Are you pregnant? Select one.    No   Not selected:    Yes   When was your last menstrual period? If you don't currently have periods or no  longer have periods, please briefly explain.    Ended 3/17   Are you breastfeeding? Select one.    No   Not selected:    Yes   Have you had sexual intercourse in the past week? Recent sexual intercourse is a risk factor for urinary tract infections. Select one.    Yes   Not selected:    No   Have you been exposed to a sexually transmitted infection (STI or STD) in the last month? Examples include chlamydia, gonorrhea, trichomoniasis, and herpes. Select one.    No, not that I know of   Not selected:    Yes   Have you had unprotected sexual intercourse with a new partner in the last 2 weeks? Select one.    No   Not selected:    Yes   Have you traveled to any of these countries within the last 3 months? Recent travel to these countries may affect which medication we recommend for your symptoms. Select all that apply.    None of these   Not selected:    [place]    [place]    [place]    [place]   Acetaminophen (Tylenol)    Helpful   Not selected:    Not helpful   Have you ever had a urinary tract infection (UTI)? A UTI is often called a bladder infection or acute cystitis. Select one.    Yes   Not selected:    No, not that I know of   How much do your current symptoms feel like past UTIs? Select one.    Exactly the same   Not selected:    Mostly the same    Somewhat the same    Totally different   In the past year, how many times have you taken antibiotics for a UTI? Select one.    1 to 3   Not selected:    0    4 or more   When did you last take antibiotics for a UTI? Select one.    More than 1 month ago   Not selected:    Less than 1 month ago   Do you remember which antibiotics you took for UTIs in the past? Select one.    No   Not selected:    Yes   Have you ever developed a yeast infection as a result of taking antibiotics? Select one.    No, not that I know of   Not selected:    Yes   UTIs may be more serious when other factors are present. Let's address those now. Are you being treated for type 1 or type 2 diabetes?  Select one.    No   Not selected:    Yes   Do you have any of these conditions that can affect the immune system? Scroll to see all options. Select all that apply.    None of these   Not selected:    History of bone marrow transplant    Chronic kidney disease    Chronic liver disease (including cirrhosis)    HIV/AIDS    Inflammatory bowel disease (Crohn's disease or ulcerative colitis)    Lupus    Moderate to severe plaque psoriasis    Multiple sclerosis    Rheumatoid arthritis    Sickle cell anemia    Alpha or beta thalassemia    History of solid organ transplant (kidney, liver, or heart)    History of spleen removal    An autoimmune disorder not listed here    A condition requiring treatment with long-term use of oral steroids (such as prednisone, prednisolone, or dexamethasone)   Have you ever been diagnosed with cancer? Select one.    No   Not selected:    Yes, I have cancer now    Yes, but I'm in remission   These last few questions will help us create the right treatment plan for you. Are you being treated for any of these conditions? Select all that apply.    No   Not selected:    Rustam-Danlos syndrome    Folate deficiency    G6PD deficiency    High blood pressure    History of aortic aneurysm or dissection    Marfan syndrome    Megaloblastic anemia    Mono (mononucleosis)    Myasthenia gravis    Oliguria or anuria    Peripheral vascular disease   Have you ever had jaundice as a result of taking amoxicillin-clavulanate (Augmentin) or nitrofurantoin (Macrobid)? Select all that apply.    No   Not selected:    Yes, from amoxicillin-clavulanate (Augmentin)    Yes, from nitrofurantoin (Macrobid, Macrodantin)   Are you taking any of these medications? Select all that apply.    No   Not selected:    An ACE inhibitor such as lisinopril, enalapril, captopril, or benazepril    An angiotensin II receptor blocker (ARB) such as candesartan, irbesartan, losartan, or valsartan   Are you still taking these medications  listed in your medical record? If you're not taking any of these, click Next. Select all that apply.    Ventolin  (90 Base) MCG/ACT inhaler    levocetirizine 5 MG tablet    budesonide-formoterol 160-4.5 MCG/ACT inhaler    azelastine 0.1 % nasal spray    ipratropium-albuterol 0.5-2.5 mg/3 ml nebulizer   Are you taking any other medications, vitamins, or supplements? Select one.    No   Not selected:    Yes   Have you ever had an allergic or bad reaction to any medication? Select one.    No   Not selected:    Yes   Do you need a doctor's note? A doctor's note confirms that you received care today and states when you can return to school or work. It does not contain information about your diagnosis or treatment plan. Your provider will make the final decision on whether to give you a doctor's note. Doctor's notes CANNOT be backdated. Select one.    No   Not selected:    Today only (1 day)    Today and tomorrow (2 days)    3 days   Is there anything else you'd like to tell us about your symptoms?    UTIs started when I got my IUD taken out. Seems to correlate with my periods.   ----------   Medical history   Medical history data does not currently exist for this patient.